# Patient Record
Sex: FEMALE | NOT HISPANIC OR LATINO | Employment: PART TIME | URBAN - METROPOLITAN AREA
[De-identification: names, ages, dates, MRNs, and addresses within clinical notes are randomized per-mention and may not be internally consistent; named-entity substitution may affect disease eponyms.]

---

## 2017-02-07 ENCOUNTER — CARE COORDINATION (OUTPATIENT)
Dept: CASE MANAGEMENT | Facility: CLINIC | Age: 27
End: 2017-02-07

## 2017-02-21 ENCOUNTER — OFFICE VISIT (OUTPATIENT)
Dept: DERMATOLOGY | Facility: CLINIC | Age: 27
End: 2017-02-21

## 2017-02-21 DIAGNOSIS — R61 GENERALIZED HYPERHIDROSIS: Primary | ICD-10-CM

## 2017-02-21 RX ORDER — OXYBUTYNIN CHLORIDE 10 MG/1
10 TABLET, EXTENDED RELEASE ORAL DAILY
Qty: 30 TABLET | Refills: 2 | Status: SHIPPED | OUTPATIENT
Start: 2017-02-21

## 2017-02-21 ASSESSMENT — PAIN SCALES - GENERAL: PAINLEVEL: NO PAIN (0)

## 2017-02-21 NOTE — LETTER
"2/21/2017       RE: Jai Parker  [address]  [facility] [address]     Dear Colleague,    Thank you for referring your patient, Jai Parker, to the [facility] DERMATOLOGY at [facility]. Please see a copy of my visit note below.    [facility] Dermatology Note      Dermatology Problem List:  1. Hyperhidrosis, face, forehead, neck, groin. Work-up for secondary hyperhidrosis pending.   - Labs: CBC w/ diff, CMP, TSH w/ reflex free T4, HgBA1c, urine metanephrines/catecholamines   - oxybutynin XR 10 mg PO qdaily  - prior tx: glycopyrrolate 2 mg PO BID uptitrated up to TID dosing (dry mouth)    Encounter Date: Feb 21, 2017    CC:   Chief Complaint   Patient presents with     Derm Problem     has concerns with her hyperhidrosis and discoloration of skin on inner thighs         History of Present Illness:  Ms. Jai Parker is a 26 year old female who presents in self referral for hyperhidrosis of her face, forehead, neck, back, and groin areas. She states this has been essentially present most of her lifetime but more bothersome for the last 4-5 years. Triggers include vigorous exercise, anxiety with symptoms occuring intermittently throughout the day, nearly everyday. Furthermore, she had recently gained about 60 lbs during her pregnancy last year and feels as though symptoms have been worse since her weight gain. She also complains of \"cold sweats\" at nighttime with occasionally associated fast heart beat. Has had her mother take her blood pressure at home which she states was \"normal\". She reports no excessive sweating in the armpits, palms or other parts of her body. She has no other chronic medical conditions and does not take any medications regularly, including over-the-counter medications. She occasionally does take ibuprofen as needs for headaches. She saw a dermatologist at [facility] in [facility] in 9/2016 who " suggested trying oral glycopyrrolate. She initially started at a dose of 2 mg twice daily which was subsequently increased to 2 mg three times daily. She reports no significant benefit in her symptoms after treatment and significant side effects including dry mouth. This was subsequently discontinued and she was referred to [facility] for further treatment options. She has no other skin concerns.     Past Medical History:   There is no problem list on file for this patient.    History reviewed. No pertinent past medical history.  History reviewed. No pertinent past surgical history.    Social History:  The patient works. The patient denies use of tanning beds.    Family History:  There is no family history of skin cancer.    Medications:  No current outpatient prescriptions on file.        Allergies   Allergen Reactions     Penicillins Hives         Review of Systems:  -Skin/Heme New Pt: The patient denies frequent sun exposure. The patient denies excessive scarring or problems healing except as per HPI. The patient denies excessive bleeding.  -Constitutional: The patient denies fatigue, fevers, chills, unintended weight loss, and night sweats.  -HEENT: Patient denies nonhealing oral sores.  -Skin: As above in HPI. No additional skin concerns.    Physical exam:  Vitals: There were no vitals taken for this visit.  GEN: This is a well developed, well-nourished female in no acute distress, in a pleasant mood.    SKIN: Examination of the face, scalp, neck, upper chest, back, upper extremities, lower extremities, groin/axillae was performed.  - Mild hyperpigmentation in bilateral groin folds.  - Otherwise, exam of areas as above within normal limits.   - No observable sweating today  - No other lesions of concern on areas examined.     Impression/Plan:    1. Hyperhidrosis. Face, scalp, neck, back, and groin area. No palmar or axillary involvement. No skin breakdown, which may limit therapeutic options. Given somewhat  "atypical distribution (not particularly eccrine-rich sites) and self-report history of tachycardia, \"cold sweats\" at nighttime, we recommended work-up for causes of secondary hyperhidrosis including thyroid function, diabetes screening, and urine metabolites to r/o pheocytochroma. She has previously not responded to oral glycopyrrolate at up to 2mg TID. We will alternatively start oxybutynin XR 10 mg PO qdaily which can be further up-titrated as needed. Will also consider propantheline if no response here.     Labs: CBC w/ diff, CMP, TSH, HgBA1c, ESR, urine catecholamines/metanephrines; no flushing/diarrhea to suggest carcinoid, deferred 5HIAA    Start oxybutynin XR 10 mg PO qdaily; can uptitrate at next visit as needed depending on side effects. GoodRx coupon provided as this is unlikely to be covered by her insurance.     Discussed that efficacy rate for many anticholinergics in hyperhidrosis is about 50%; will try this alternative.    Discussed using Zeasorb in the groin folds to absorb moisture    Follow-up in 1 months.     Follow-up in 1 months, earlier for new or changing lesions.   Dr. Nelson staffed the patient.    Staff Involved:  Resident(Maximiliano Hawk)/Staff(as above)    Staff Physician Comments:   I saw and evaluated the patient with the resident and I agree with the assessment and plan.  I was present for the examination.    Michael Nelson MD  Dermatology Staff Physician  , Department of Dermatology          "

## 2017-02-21 NOTE — NURSING NOTE
Dermatology Rooming Note    Jai Parker's goals for this visit include:   Chief Complaint   Patient presents with     Derm Problem     has concerns with her hyperhidrosis and discoloration of skin on inner thighs       Idalia Pichardo LPN

## 2017-02-21 NOTE — PROGRESS NOTES
"[facility] Dermatology Note      Dermatology Problem List:  1. Hyperhidrosis, face, forehead, neck, groin. Work-up for secondary hyperhidrosis pending.   - Labs: CBC w/ diff, CMP, TSH w/ reflex free T4, HgBA1c, urine metanephrines/catecholamines   - oxybutynin XR 10 mg PO qdaily  - prior tx: glycopyrrolate 2 mg PO BID uptitrated up to TID dosing (dry mouth)    Encounter Date: Feb 21, 2017    CC:   Chief Complaint   Patient presents with     Derm Problem     has concerns with her hyperhidrosis and discoloration of skin on inner thighs         History of Present Illness:  Ms. Jai Parker is a 26 year old female who presents in self referral for hyperhidrosis of her face, forehead, neck, back, and groin areas. She states this has been essentially present most of her lifetime but more bothersome for the last 4-5 years. Triggers include vigorous exercise, anxiety with symptoms occuring intermittently throughout the day, nearly everyday. Furthermore, she had recently gained about 60 lbs during her pregnancy last year and feels as though symptoms have been worse since her weight gain. She also complains of \"cold sweats\" at nighttime with occasionally associated fast heart beat. Has had her mother take her blood pressure at home which she states was \"normal\". She reports no excessive sweating in the armpits, palms or other parts of her body. She has no other chronic medical conditions and does not take any medications regularly, including over-the-counter medications. She occasionally does take ibuprofen as needs for headaches. She saw a dermatologist at [facility] in [facility] in 9/2016 who suggested trying oral glycopyrrolate. She initially started at a dose of 2 mg twice daily which was subsequently increased to 2 mg three times daily. She reports no significant benefit in her symptoms after treatment and significant side effects including dry mouth. This was subsequently " "discontinued and she was referred to [facility] for further treatment options. She has no other skin concerns.     Past Medical History:   There is no problem list on file for this patient.    History reviewed. No pertinent past medical history.  History reviewed. No pertinent past surgical history.    Social History:  The patient works. The patient denies use of tanning beds.    Family History:  There is no family history of skin cancer.    Medications:  No current outpatient prescriptions on file.        Allergies   Allergen Reactions     Penicillins Hives         Review of Systems:  -Skin/Heme New Pt: The patient denies frequent sun exposure. The patient denies excessive scarring or problems healing except as per HPI. The patient denies excessive bleeding.  -Constitutional: The patient denies fatigue, fevers, chills, unintended weight loss, and night sweats.  -HEENT: Patient denies nonhealing oral sores.  -Skin: As above in HPI. No additional skin concerns.    Physical exam:  Vitals: There were no vitals taken for this visit.  GEN: This is a well developed, well-nourished female in no acute distress, in a pleasant mood.    SKIN: Examination of the face, scalp, neck, upper chest, back, upper extremities, lower extremities, groin/axillae was performed.  - Mild hyperpigmentation in bilateral groin folds.  - Otherwise, exam of areas as above within normal limits.   - No observable sweating today  - No other lesions of concern on areas examined.     Impression/Plan:    1. Hyperhidrosis. Face, scalp, neck, back, and groin area. No palmar or axillary involvement. No skin breakdown, which may limit therapeutic options. Given somewhat atypical distribution (not particularly eccrine-rich sites) and self-report history of tachycardia, \"cold sweats\" at nighttime, we recommended work-up for causes of secondary hyperhidrosis including thyroid function, diabetes screening, and urine metabolites to r/o pheocytochroma. She has " previously not responded to oral glycopyrrolate at up to 2mg TID. We will alternatively start oxybutynin XR 10 mg PO qdaily which can be further up-titrated as needed. Will also consider propantheline if no response here.     Labs: CBC w/ diff, CMP, TSH, HgBA1c, ESR, urine catecholamines/metanephrines; no flushing/diarrhea to suggest carcinoid, deferred 5HIAA    Start oxybutynin XR 10 mg PO qdaily; can uptitrate at next visit as needed depending on side effects. GoodRx coupon provided as this is unlikely to be covered by her insurance.     Discussed that efficacy rate for many anticholinergics in hyperhidrosis is about 50%; will try this alternative.    Discussed using Zeasorb in the groin folds to absorb moisture    Follow-up in 1 months.     Follow-up in 1 months, earlier for new or changing lesions.   Dr. Nelson staffed the patient.    Staff Involved:  Resident(Maximiliano Hawk)/Staff(as above)    Staff Physician Comments:   I saw and evaluated the patient with the resident and I agree with the assessment and plan.  I was present for the examination.    Michael Nelson MD  Dermatology Staff Physician  , Department of Dermatology

## 2017-02-21 NOTE — MR AVS SNAPSHOT
After Visit Summary   2/21/2017    Jai Parker    MRN: [id number]           Patient Information     Date Of Birth          1990        Visit Information        Provider Department      2/21/2017 3:45 PM Michael Nelson MD [facility] Dermatology        Today's Diagnoses     Generalized hyperhidrosis    -  1       Follow-ups after your visit        Your next 10 appointments already scheduled     Feb 22, 2017 12:30 PM CST   LAB with  LAB   [facility] ([facility])    [address] [place] [id number] [phone]           Patient must bring picture ID.  Patient should be prepared to give a urine specimen  Please do not eat 10-12 hours before your appointment if you are coming in fasting for labs on lipids, cholesterol, or glucose (sugar).  Pregnant women should follow their Care Team instructions. Water with medications is okay. Do not drink coffee or other fluids.   If you have concerns about taking  your medications, please ask at office or if scheduling via SocialDeckt, send a message by clicking on Secure Messaging, Message Your Care Team.            Apr 04, 2017  4:00 PM CDT   (Arrive by 3:45 PM)   Return Visit with Michael Nelson MD   [facility] Dermatology ([facility])    [address] [place] [id number] [phone]              Future tests that were ordered for you today     Open Future Orders        Priority Expected Expires Ordered    Erythrocyte sedimentation rate auto Routine  2/21/2018 2/21/2017    Metanephrine random or 24 hr urine Routine  2/22/2018 2/21/2017    Catecholamines fractioned free urine Routine  2/22/2018 2/21/2017    CBC with platelets differential Routine  2/21/2018 2/21/2017    Comprehensive metabolic panel Routine  2/21/2018 2/21/2017    TSH with free T4 reflex Routine  2/21/2018 2/21/2017    Hemoglobin A1c Routine  2/22/2018 2/21/2017            Who to  contact     Please call your clinic at [phone] to:    Ask questions about your health    Make or cancel appointments    Discuss your medicines    Learn about your test results    Speak to your doctor   If you have compliments or concerns about an experience at your clinic, or if you wish to file a complaint, please contact [facility] Patient Relations at [phone] or email us at [email or web address]         Additional Information About Your Visit        Care EveryWhere ID     This is your Care EveryWhere ID. This could be used by other organizations to access your [facility] medical records  [id number]         Blood Pressure from Last 3 Encounters:   No data found for BP    Weight from Last 3 Encounters:   No data found for Wt                 Today's Medication Changes          These changes are accurate as of: 2/21/17  4:30 PM.  If you have any questions, ask your nurse or doctor.               Start taking these medicines.        Dose/Directions    oxybutynin 10 MG 24 hr tablet   Commonly known as:  DITROPAN XL   Used for:  Generalized hyperhidrosis   Started by:  Michael Nelson MD        Dose:  10 mg   Take 1 tablet (10 mg) by mouth daily   Quantity:  30 tablet   Refills:  2            Where to get your medicines      These medications were sent to [facility] [address] [address] AT [address] [place], [place] [address]     Phone:  [phone]     oxybutynin 10 MG 24 hr tablet                Primary Care Provider    No Pcp Confirmed       No address on file        Thank you!     Thank you for choosing [facility] DERMATOLOGY  for your care. Our goal is always to provide you with excellent care. Hearing back from our patients is one way we can continue to improve our services. Please take a few minutes to complete the written survey that you may receive in the mail after your visit with us. Thank you!              Your Updated Medication List - Protect others around you: Learn how to safely use, store and throw away your medicines at www.disposemymeds.org.          This list is accurate as of: 2/21/17  4:30 PM.  Always use your most recent med list.                   Brand Name Dispense Instructions for use    oxybutynin 10 MG 24 hr tablet    DITROPAN XL    30 tablet    Take 1 tablet (10 mg) by mouth daily

## 2017-07-10 ENCOUNTER — OFFICE VISIT (OUTPATIENT)
Dept: DERMATOLOGY | Facility: CLINIC | Age: 27
End: 2017-07-10

## 2017-07-10 DIAGNOSIS — L74.519 FOCAL HYPERHIDROSIS: ICD-10-CM

## 2017-07-10 DIAGNOSIS — L30.4 INTERTRIGO: ICD-10-CM

## 2017-07-10 DIAGNOSIS — L74.519 FOCAL HYPERHIDROSIS: Primary | ICD-10-CM

## 2017-07-10 LAB
ALBUMIN SERPL-MCNC: 3.6 G/DL (ref 3.4–5)
ALP SERPL-CCNC: 70 U/L (ref 40–150)
ALT SERPL W P-5'-P-CCNC: 13 U/L (ref 0–50)
ANION GAP SERPL CALCULATED.3IONS-SCNC: 7 MMOL/L (ref 3–14)
AST SERPL W P-5'-P-CCNC: 21 U/L (ref 0–45)
BASOPHILS # BLD AUTO: 0.1 10E9/L (ref 0–0.2)
BASOPHILS NFR BLD AUTO: 0.6 %
BILIRUB SERPL-MCNC: 1 MG/DL (ref 0.2–1.3)
BUN SERPL-MCNC: 11 MG/DL (ref 7–30)
CALCIUM SERPL-MCNC: 8.6 MG/DL (ref 8.5–10.1)
CHLORIDE SERPL-SCNC: 106 MMOL/L (ref 94–109)
CO2 SERPL-SCNC: 24 MMOL/L (ref 20–32)
CREAT SERPL-MCNC: 0.65 MG/DL (ref 0.52–1.04)
DIFFERENTIAL METHOD BLD: ABNORMAL
EOSINOPHIL # BLD AUTO: 0.3 10E9/L (ref 0–0.7)
EOSINOPHIL NFR BLD AUTO: 2.4 %
ERYTHROCYTE [DISTWIDTH] IN BLOOD BY AUTOMATED COUNT: 15.5 % (ref 10–15)
ERYTHROCYTE [SEDIMENTATION RATE] IN BLOOD BY WESTERGREN METHOD: 33 MM/H (ref 0–20)
GFR SERPL CREATININE-BSD FRML MDRD: NORMAL ML/MIN/1.7M2
GLUCOSE SERPL-MCNC: 78 MG/DL (ref 70–99)
HBA1C MFR BLD: 5.8 % (ref 4.3–6)
HCT VFR BLD AUTO: 33.3 % (ref 35–47)
HGB BLD-MCNC: 10.9 G/DL (ref 11.7–15.7)
IMM GRANULOCYTES # BLD: 0.1 10E9/L (ref 0–0.4)
IMM GRANULOCYTES NFR BLD: 0.5 %
LYMPHOCYTES # BLD AUTO: 2.2 10E9/L (ref 0.8–5.3)
LYMPHOCYTES NFR BLD AUTO: 20 %
MCH RBC QN AUTO: 20.7 PG (ref 26.5–33)
MCHC RBC AUTO-ENTMCNC: 32.7 G/DL (ref 31.5–36.5)
MCV RBC AUTO: 63 FL (ref 78–100)
MONOCYTES # BLD AUTO: 0.8 10E9/L (ref 0–1.3)
MONOCYTES NFR BLD AUTO: 6.9 %
NEUTROPHILS # BLD AUTO: 7.7 10E9/L (ref 1.6–8.3)
NEUTROPHILS NFR BLD AUTO: 69.6 %
NRBC # BLD AUTO: 0 10*3/UL
NRBC BLD AUTO-RTO: 0 /100
PLATELET # BLD AUTO: 288 10E9/L (ref 150–450)
POTASSIUM SERPL-SCNC: 4.1 MMOL/L (ref 3.4–5.3)
PROT SERPL-MCNC: 7.4 G/DL (ref 6.8–8.8)
RBC # BLD AUTO: 5.26 10E12/L (ref 3.8–5.2)
SODIUM SERPL-SCNC: 136 MMOL/L (ref 133–144)
TSH SERPL DL<=0.005 MIU/L-ACNC: 0.67 MU/L (ref 0.4–4)
WBC # BLD AUTO: 11 10E9/L (ref 4–11)

## 2017-07-10 RX ORDER — KETOCONAZOLE 20 MG/G
CREAM TOPICAL DAILY
Qty: 60 G | Refills: 3 | Status: SHIPPED | OUTPATIENT
Start: 2017-07-10 | End: 2018-06-03

## 2017-07-10 ASSESSMENT — PAIN SCALES - GENERAL: PAINLEVEL: NO PAIN (0)

## 2017-07-10 NOTE — NURSING NOTE
"Dermatology Rooming Note    Jai Parker's goals for this visit include:   Chief Complaint   Patient presents with     Derm Problem     Loki is here today for a hyperhidrosis follow up for her face.  States it's been \"the same\" since her last visit.           Idalia Pichardo, GEORGETTE       "

## 2017-07-10 NOTE — MR AVS SNAPSHOT
After Visit Summary   7/10/2017    Jai Parker    MRN: [id number]           Patient Information     Date Of Birth          1990        Visit Information        Provider Department      7/10/2017 10:00 AM Lashawn Ramirez MD [facility] Dermatology        Today's Diagnoses     Focal hyperhidrosis    -  1    Intertrigo           Follow-ups after your visit        Who to contact     Please call your clinic at [phone] to:    Ask questions about your health    Make or cancel appointments    Discuss your medicines    Learn about your test results    Speak to your doctor   If you have compliments or concerns about an experience at your clinic, or if you wish to file a complaint, please contact [facility] Physicians Patient Relations at [phone] or email us at [email or web address]         Additional Information About Your Visit        Care EveryWhere ID     This is your Care EveryWhere ID. This could be used by other organizations to access your [facility] medical records  [id number]         Blood Pressure from Last 3 Encounters:   No data found for BP    Weight from Last 3 Encounters:   No data found for Wt              We Performed the Following     C Botulinum Toxin type A (Botox)          Today's Medication Changes          These changes are accurate as of: 7/10/17 11:59 PM.  If you have any questions, ask your nurse or doctor.               Start taking these medicines.        Dose/Directions    botulinum toxin type A 100 UNITS injection   Commonly known as:  BOTOX   Used for:  Focal hyperhidrosis   Started by:  Lashawn Ramirez MD        Dose:  100 Units   Inject 100 Units into the muscle once for 1 dose   Quantity:  100 Units   Refills:  0       ketoconazole 2 % cream   Commonly known as:  NIZORAL   Used for:  Intertrigo   Started by:  Lashawn Ramirez MD        Apply topically daily   Quantity:  60 g   Refills:  3            Where to get  your medicines      These medications were sent to [id number] - [place] - [address] AT   [address] [place], [place] [address]     Phone:  [phone]     ketoconazole 2 % cream         Some of these will need a paper prescription and others can be bought over the counter.  Ask your nurse if you have questions.     Bring a paper prescription for each of these medications     botulinum toxin type A 100 UNITS injection                Primary Care Provider    No Pcp Confirmed       No address on file        Equal Access to Services     MARCELA DUMAS : Hadii aad ku hadasho Soomaali, waaxda luqadaha, qaybta kaalmada adeskylaryanorma, adri cuadra . So [facility] [phone].    ATENCIÓN: Si habla español, tiene a tijerina disposición servicios gratuitos de asistencia lingüística. LlEast [facility] [phone].    We comply with applicable federal civil rights laws and [place] laws. We do not discriminate on the basis of race, color, national origin, age, disability sex, sexual orientation or gender identity.            Thank you!     Thank you for choosing [facility] DERMATOLOGY  for your care. Our goal is always to provide you with excellent care. Hearing back from our patients is one way we can continue to improve our services. Please take a few minutes to complete the written survey that you may receive in the mail after your visit with us. Thank you!             Your Updated Medication List - Protect others around you: Learn how to safely use, store and throw away your medicines at www.disposemymeds.org.          This list is accurate as of: 7/10/17 11:59 PM.  Always use your most recent med list.                   Brand Name Dispense Instructions for use Diagnosis    botulinum toxin type A 100 UNITS injection    BOTOX    100 Units    Inject 100 Units into the muscle once for 1 dose    Focal hyperhidrosis       ketoconazole 2 % cream    NIZORAL    60 g     Apply topically daily    Intertrigo       oxybutynin 10 MG 24 hr tablet    DITROPAN XL    30 tablet    Take 1 tablet (10 mg) by mouth daily    Generalized hyperhidrosis

## 2017-07-10 NOTE — PROGRESS NOTES
.  [facility] Dermatology Note      Dermatology Problem List:  1. Hyperhidrosis, face, forehead, neck, groin. Work-up for secondary hyperhidrosis pending.   - Labs: CBC w/ diff, CMP, TSH w/ reflex free T4, HgBA1c, urine metanephrines/catecholamines   - oxybutynin XR 10 mg PO qdaily  - prior tx: glycopyrrolate 2 mg PO BID uptitrated up to TID dosing (dry mouth), oxybutynin (dry mouth)    Encounter Date: Jul 10, 2017    CC:  Facial and scalp sweating that is causing embarrassment, not improving on current medications.    History of Present Illness:  Ms. Jai Parker is a 26 year old female who presents as a follow-up for hyperhidrosis of the face, forehead, neck, back and groin area. The patient was last seen 02/2017 by Dr. Nelson, she was originally referred from the [facility]. Patient was prescribed glycopyrolate in June 2016 but experienced dry mouth so she stopped taking it after two months, she relates that it did not improve her hyperhidrosis. She was started on oxybutynin at her last appointment with Dr. Nelson, she states it has not helped her hyperhidrosis and has also caused dry mouth. She states today she is interested in botox injections since she feels the medications have not helped her at all.  She states she has a 4 year history of hyperhidrosis that worsened during her pregnancy in 2015 and has continued to bother her post-partum. She state it mostly happens when she is active outside but also occurs sometimes at night when she wakes up with tachycardia. She denies any associated rash or pruritis with her hyperhidrosis but relates that it causes her a lot of discomfort and embarrassment. In addition, she relates to darkening of her skin in the inguinal area that she finds embarrassing, she is interested in a lightening cream. Of note, she reports multiple vaginal yeast infections. She relates she was recently seen by an outside physician and had bloodwork done for her  thyroid, per patient bloodwork was normal.     Past Medical History:   There is no problem list on file for this patient.    No past medical history on file.  No past surgical history on file.    Social History:  The patient is currently working, planning to move to [place] next year    Family History:  She denies any family history of hyperhidrosis.     Medications:  Current Outpatient Prescriptions   Medication Sig Dispense Refill     oxybutynin (DITROPAN XL) 10 MG 24 hr tablet Take 1 tablet (10 mg) by mouth daily 30 tablet 2     Allergies   Allergen Reactions     Penicillins Hives         Review of Systems:  -As per HPI  -Constitutional: The patient denies fatigue, fevers, chills, unintended weight loss, and night sweats.  -Skin: As above in HPI. No additional skin concerns.    Physical exam:  Vitals: There were no vitals taken for this visit.  GEN: This is a well developed, well-nourished female in no acute distress, in a pleasant mood.    SKIN: Focused examination of the groin was performed.  - b/l inguinal crease: dark, hyperpigmented thin plaque   -No other lesions of concern on areas examined.     Impression/Plan:  1. Hyperhidrosis of face, scalp, back and groin area, not improved with glycopyrrolate or oxybutynin and patient experiencing anticholinergic side effects from both of these medications. Plan to address constituitional symptoms and distribution of hyperhidrosis with labs to rule out thyroid dysfunction, diabetes, or any signs of malignancy or autoimmune disorders. After labs are completed to rule out any causes for secondary hyperhidrosis, plan for botox scalp treatments.    Labs: CBC w/ diff, CMP, TSH, HgBA1c, ESR, urine catecholamines/metanephrines; no flushing/diarrhea to suggest carcinoid, deferred 5HIAALabs     Plan to stop oxybutynin today     Referral to cosmetics for botox scalp injections    2. Hyperpigmented plaque most consistent with candida intertrigo    Ketoconazole cream 2% BID for 1  week until resolution         Follow-up PRN, patient will follow up with cosmetics for botox    Staff Involved:  Scribed by La Nena Perez, MS4 for Dr. Lashawn Mcintosh.      .The medical student acted as scribe for this encounter.  The encounter documented above was completely performed by myself.  Lashawn Ramirez MD

## 2017-07-10 NOTE — LETTER
7/10/2017       RE: Jai Parker  [address]  [facility] [address]     Dear Colleague,    Thank you for referring your patient, Jai Parker, to the [facility] DERMATOLOGY at [facility]. Please see a copy of my visit note below.    .  [facility] Dermatology Note      Dermatology Problem List:  1. Hyperhidrosis, face, forehead, neck, groin. Work-up for secondary hyperhidrosis pending.   - Labs: CBC w/ diff, CMP, TSH w/ reflex free T4, HgBA1c, urine metanephrines/catecholamines   - oxybutynin XR 10 mg PO qdaily  - prior tx: glycopyrrolate 2 mg PO BID uptitrated up to TID dosing (dry mouth), oxybutynin (dry mouth)    Encounter Date: Jul 10, 2017    CC:  Facial and scalp sweating that is causing embarrassment, not improving on current medications.    History of Present Illness:  Ms. Jai Parker is a 26 year old female who presents as a follow-up for hyperhidrosis of the face, forehead, neck, back and groin area. The patient was last seen 02/2017 by Dr. Nelson, she was originally referred from the [facility] clinic. Patient was prescribed glycopyrolate in June 2016 but experienced dry mouth so she stopped taking it after two months, she relates that it did not improve her hyperhidrosis. She was started on oxybutynin at her last appointment with Dr. Nelson, she states it has not helped her hyperhidrosis and has also caused dry mouth. She states today she is interested in botox injections since she feels the medications have not helped her at all.  She states she has a 4 year history of hyperhidrosis that worsened during her pregnancy in 2015 and has continued to bother her post-partum. She state it mostly happens when she is active outside but also occurs sometimes at night when she wakes up with tachycardia. She denies any associated rash or pruritis with her hyperhidrosis but relates that it causes her a lot of discomfort and embarrassment. In  addition, she relates to darkening of her skin in the inguinal area that she finds embarrassing, she is interested in a lightening cream. Of note, she reports multiple vaginal yeast infections. She relates she was recently seen by an outside physician and had bloodwork done for her thyroid, per patient bloodwork was normal.     Past Medical History:   There is no problem list on file for this patient.    No past medical history on file.  No past surgical history on file.    Social History:  The patient is currently working, planning to move to [place] next year    Family History:  She denies any family history of hyperhidrosis.     Medications:  Current Outpatient Prescriptions   Medication Sig Dispense Refill     oxybutynin (DITROPAN XL) 10 MG 24 hr tablet Take 1 tablet (10 mg) by mouth daily 30 tablet 2     Allergies   Allergen Reactions     Penicillins Hives         Review of Systems:  -As per HPI  -Constitutional: The patient denies fatigue, fevers, chills, unintended weight loss, and night sweats.  -Skin: As above in HPI. No additional skin concerns.    Physical exam:  Vitals: There were no vitals taken for this visit.  GEN: This is a well developed, well-nourished female in no acute distress, in a pleasant mood.    SKIN: Focused examination of the groin was performed.  - b/l inguinal crease: dark, hyperpigmented thin plaque   -No other lesions of concern on areas examined.     Impression/Plan:  1. Hyperhidrosis of face, scalp, back and groin area, not improved with glycopyrrolate or oxybutynin and patient experiencing anticholinergic side effects from both of these medications. Plan to address constituitional symptoms and distribution of hyperhidrosis with labs to rule out thyroid dysfunction, diabetes, or any signs of malignancy or autoimmune disorders. After labs are completed to rule out any causes for secondary hyperhidrosis, plan for botox scalp treatments.    Labs: CBC w/ diff, CMP, TSH, HgBA1c, ESR,  urine catecholamines/metanephrines; no flushing/diarrhea to suggest carcinoid, deferred 5HIAALabs     Plan to stop oxybutynin today     Referral to cosmetics for botox scalp injections    2. Hyperpigmented plaque most consistent with candida intertrigo    Ketoconazole cream 2% BID for 1 week until resolution         Follow-up PRN, patient will follow up with cosmetics for botox    Staff Involved:  Scribed by La Nena Perez, MS4 for Dr. Lashawn Mcintosh.      .The medical student acted as scribe for this encounter.  The encounter documented above was completely performed by myself.  Lashawn Ramirez MD

## 2017-07-13 LAB
COLLECT DURATION TIME UR: NORMAL H
CREAT 24H UR-MRATE: NORMAL G/(24.H)
CREAT SERPL-MCNC: 224 MG/DL
METANEPH 24H UR-MCNC: 315 NG/ML
METANEPH 24H UR-MRATE: NORMAL
METANEPH+NORMETANEPH UR-IMP: NORMAL
METANEPH/CREAT 24H UR: 141
NORMETANEPHRINE 24H UR-MCNC: 266 UG/ML
NORMETANEPHRINE 24H UR-MRATE: NORMAL
NORMETANEPHRINE/CREAT 24H UR: 119
SPECIMEN VOL ?TM UR: NORMAL ML

## 2017-07-17 ENCOUNTER — TELEPHONE (OUTPATIENT)
Dept: DERMATOLOGY | Facility: CLINIC | Age: 27
End: 2017-07-17

## 2017-07-17 NOTE — TELEPHONE ENCOUNTER
Sent PA information to Katie. PA was approved, I routed message to Lolita to get patient scheduled with Dr. Miller.

## 2017-08-16 ENCOUNTER — TELEPHONE (OUTPATIENT)
Dept: DERMATOLOGY | Facility: CLINIC | Age: 27
End: 2017-08-16

## 2017-08-16 NOTE — TELEPHONE ENCOUNTER
LVM for patient to call clinic if she has questions regarding scalp botox.    Sarah Churchill RN

## 2017-08-16 NOTE — TELEPHONE ENCOUNTER
----- Message from Andressa Merchant sent at 8/15/2017  5:19 PM CDT -----  Regarding: scalp botox  Seeing Dr. Becerra on Thur, worried about pain management for botox injections.     Thanks!

## 2017-08-17 DIAGNOSIS — L74.519 FOCAL HYPERHIDROSIS: Primary | ICD-10-CM

## 2017-08-17 NOTE — PROGRESS NOTES
Orders for botox fr focal hyperhidrosis of scalp.    Freddie Becerra MD, MS    Department of Dermatology  [facility]: Phone: [phone], Fax:[phone]  [facility]: Phone: [phone], Fax: [phone]

## 2018-01-03 ENCOUNTER — OFFICE VISIT (OUTPATIENT)
Dept: DERMATOLOGY | Facility: CLINIC | Age: 28
End: 2018-01-03
Payer: COMMERCIAL

## 2018-01-03 VITALS
SYSTOLIC BLOOD PRESSURE: 136 MMHG | RESPIRATION RATE: 18 BRPM | DIASTOLIC BLOOD PRESSURE: 88 MMHG | WEIGHT: 177.4 LBS | HEART RATE: 74 BPM

## 2018-01-03 DIAGNOSIS — L74.519 FOCAL HYPERHIDROSIS: Primary | ICD-10-CM

## 2018-01-03 ASSESSMENT — PAIN SCALES - GENERAL: PAINLEVEL: NO PAIN (0)

## 2018-01-03 NOTE — MR AVS SNAPSHOT
After Visit Summary   1/3/2018    Jai Parker    MRN: [id number]           Patient Information     Date Of Birth          1990        Visit Information        Provider Department      1/3/2018 12:00 PM Emma Miller MD [facility] Dermatology         Follow-ups after your visit        Your next 10 appointments already scheduled     2018 12:00 PM CST   (Arrive by 11:45 AM)   Return Visit with MD ANTHONY Woodall [facility] Dermatology ([facility])    [address] [place] [id number] [phone]              Who to contact     Please call your clinic at [phone] to:    Ask questions about your health    Make or cancel appointments    Discuss your medicines    Learn about your test results    Speak to your doctor   If you have compliments or concerns about an experience at your clinic, or if you wish to file a complaint, please contact [facility] Patient Relations at [phone] or email us at [email or web address]         Additional Information About Your Visit        MyChart Information     MolecularMD is an electronic gateway that provides easy, online access to your medical records. With MolecularMD, you can request a clinic appointment, read your test results, renew a prescription or communicate with your care team.     To sign up for MolecularMD visit the website at [email or web address]   You will be asked to enter the access code listed below, as well as some personal information. Please follow the directions to create your username and password.     Your access code is: [email or web address]  Expires: 2018  5:31 AM     Your access code will  in 90 days. If you need help or a new code, please contact your [facility] or call [phone] for assistance.        Care EveryWhere ID     This is your Care EveryWhere ID. This could be used by other organizations to access  your [place] medical records  [id number]        Your Vitals Were     Pulse Respirations                74 18           Blood Pressure from Last 3 Encounters:   01/03/18 136/88    Weight from Last 3 Encounters:   01/03/18 80.5 kg (177 lb 6.4 oz)              Today, you had the following     No orders found for display       Primary Care Provider    None Specified       No primary provider on file.        Equal Access to Services     [facility]: Hadii aad ku hadasho Soomaali, waaxda luqadaha, qaybta kaalmada adeegyada, waxay orain denizn adeskylar dumas khalif . So [facility] [phone].    ATENCIÓN: Si habla español, tiene a tijerina disposición servicios gratuitos de asistencia lingüística. Llame al [phone].    We comply with applicable federal civil rights laws and [place] laws. We do not discriminate on the basis of race, color, national origin, age, disability, sex, sexual orientation, or gender identity.            Thank you!     Thank you for choosing [facility] DERMATOLOGY  for your care. Our goal is always to provide you with excellent care. Hearing back from our patients is one way we can continue to improve our services. Please take a few minutes to complete the written survey that you may receive in the mail after your visit with us. Thank you!             Your Updated Medication List - Protect others around you: Learn how to safely use, store and throw away your medicines at www.disposemymeds.org.          This list is accurate as of: 1/3/18  2:31 PM.  Always use your most recent med list.                   Brand Name Dispense Instructions for use Diagnosis    botulinum toxin type A 100 UNITS injection    BOTOX    200 Units    Inject 200 units intradermally into affected areas of the scalp.    Focal hyperhidrosis       ketoconazole 2 % cream    NIZORAL    60 g    Apply topically daily    Intertrigo       oxybutynin 10 MG 24 hr tablet    DITROPAN XL    30 tablet    Take 1 tablet (10 mg) by mouth daily     Generalized hyperhidrosis

## 2018-01-03 NOTE — LETTER
1/3/2018       RE: Jai Parker  [address]  [facility] [address]     Dear Colleague,    Thank you for referring your patient, Jai Parker, to the [facility] DERMATOLOGY at [facility]. Please see a copy of my visit note below.    .  [facility] Dermatology Note      Dermatology Problem List:  1. Hyperhidrosis, face, forehead, neck, groin. Work-up for secondary hyperhidrosis pending.   - Labs: CBC w/ diff, CMP, TSH w/ reflex free T4, HgBA1c, urine metanephrines/catecholamines   - oxybutynin XR 10 mg PO qdaily  - prior tx: glycopyrrolate 2 mg PO BID uptitrated up to TID dosing (dry mouth), oxybutynin (dry mouth)    Encounter Date: Tae 3, 2018    CC:  Facial and scalp sweating that is causing embarrassment, not improving on current medications.    History of Present Illness:  Ms. Jai Parker is a 27 year old female who presents as a follow-up for hyperhidrosis of the face, forehead, neck, back and groin area. The patient was last seen 7/10/17 when she had a work up for Labs. The patient notes that her face and back of the neck will get soaked due to sweating from scalp and forehead. The patient works out and does not want to stop sweating altogether. The patient takes no medications. The patient tried pill treatments and they made her very dry. .        Past Medical History:   Patient Active Problem List   Diagnosis     Focal hyperhidrosis     History reviewed. No pertinent past medical history.  History reviewed. No pertinent surgical history.    Social History:  The patient is currently working, planning to move to [place] next year    Family History:  She denies any family history of hyperhidrosis.     Medications:  Current Outpatient Prescriptions   Medication Sig Dispense Refill     botulinum toxin type A (BOTOX) 100 UNITS injection Inject 200 units intradermally into affected areas of the scalp. 200 Units 0     ketoconazole (NIZORAL) 2 % cream  Apply topically daily 60 g 3     oxybutynin (DITROPAN XL) 10 MG 24 hr tablet Take 1 tablet (10 mg) by mouth daily (Patient not taking: Reported on 1/3/2018) 30 tablet 2     Allergies   Allergen Reactions     Penicillins Hives     Amoxicillin Rash         Review of Systems:  -As per HPI  -Constitutional: The patient denies fatigue, fevers, chills, unintended weight loss, and night sweats.  -Skin: As above in HPI. No additional skin concerns.    Physical exam:  Vitals: /88 (Cuff Size: Adult Regular)  Pulse 74  Resp 18  Wt 80.5 kg (177 lb 6.4 oz)  GEN: This is a well developed, well-nourished female in no acute distress, in a pleasant mood.    SKIN: Focused examination of the face was performed.  -Face is clear on exam  -No other lesions of concern on areas examined.     Component      Latest Ref Rng & Units 7/10/2017   WBC      4.0 - 11.0 10e9/L 11.0   RBC Count      3.8 - 5.2 10e12/L 5.26 (H)   Hemoglobin      11.7 - 15.7 g/dL 10.9 (L)   Hematocrit      35.0 - 47.0 % 33.3 (L)   MCV      78 - 100 fl 63 (L)   MCH      26.5 - 33.0 pg 20.7 (L)   MCHC      31.5 - 36.5 g/dL 32.7   RDW      10.0 - 15.0 % 15.5 (H)   Platelet Count      150 - 450 10e9/L 288   Diff Method       Automated Method   % Neutrophils      % 69.6   % Lymphocytes      % 20.0   % Monocytes      % 6.9   % Eosinophils      % 2.4   % Basophils      % 0.6   % Immature Granulocytes      % 0.5   Nucleated RBCs      0 /100 0   Absolute Neutrophil      1.6 - 8.3 10e9/L 7.7   Absolute Lymphocytes      0.8 - 5.3 10e9/L 2.2   Absolute Monocytes      0.0 - 1.3 10e9/L 0.8   Absolute Eosinophils      0.0 - 0.7 10e9/L 0.3   Absolute Basophils      0.0 - 0.2 10e9/L 0.1   Abs Immature Granulocytes      0 - 0.4 10e9/L 0.1   Absolute Nucleated RBC       0.0   Sodium      133 - 144 mmol/L 136   Potassium      3.4 - 5.3 mmol/L 4.1   Chloride      94 - 109 mmol/L 106   Carbon Dioxide      20 - 32 mmol/L 24   Anion Gap      3 - 14 mmol/L 7   Glucose      70 - 99  mg/dL 78   Urea Nitrogen      7 - 30 mg/dL 11   Creatinine      0.52 - 1.04 mg/dL 0.65   GFR Estimate      >60 mL/min/1.7m2 >90 . . .   GFR Estimate If Black      >60 mL/min/1.7m2 >90 . . .   Calcium      8.5 - 10.1 mg/dL 8.6   Bilirubin Total      0.2 - 1.3 mg/dL 1.0   Albumin      3.4 - 5.0 g/dL 3.6   Protein Total      6.8 - 8.8 g/dL 7.4   Alkaline Phosphatase      40 - 150 U/L 70   ALT      0 - 50 U/L 13   AST      0 - 45 U/L 21   Catecholamines Duration Urine      h Random . . .   Catecholamines Volume Urine      mL Random . . .   Urine Dopamine/Creatinine       205   Urine Dopamine       Not Applicable . . .   Dopamine per Volume Urine       17   Urine Norepineph/Creatinine       18   Urine Norepinephrine       Not Applicable . . .   Norepinephrine per Volume Urine       40   Urine Epinephrine/Creatinine       8   Urine Epinephrine       Not Applicable . . .   Epinephrine per Volume Urine       460   Catecholamines Fract Urine Free Interp       SEE NOTE . . .   Catecholamines Fract Creat Random Urine Free       224   Catecholamines Fract Creat Timed Urine Free       Not Applicable . . .   Metanephrine Duration Urine      h Random . . .   Volume      mL Random . . .   Metanephr 24 H ur/cr       141   Metanephrine 24 Hr/Random Urine       Not Applicable . . .   Metanephrine Urine per Volume       315   Normetaneph 24H ur/cr       119   Normetanephrine 24 Hr/Random Urine       Not Applicable . . .   Normetanephrine Urine per Volume       266   Metanephrine 24 Hr/Random Urine Interpretation       SEE NOTE . . .   Creatinine For Methaneph 24 H/Random Urine       224   Creatinine 24 hour       Not Applicable . . .   TSH      0.40 - 4.00 mU/L 0.67   Sed Rate      0 - 20 mm/h 33 (H)   Hemoglobin A1C      4.3 - 6.0 % 5.8       Impression/Plan:  1. Hyperhidrosis of face, scalp, back and groin area, not improved with glycopyrrolate or oxybutynin and patient experiencing anticholinergic side effects from both of these  medications.       Scheduled botox for the frontotemporal scalp and the back of fuller  for next visit.     2. Hyperpigmented plaque most consistent with candida intertrigo    Continue Ketoconazole cream 2% BID for 1 week until resolution    We will recheck at follow up visit       Follow-up as we will schedule her procedure     Staff Involved:  Scribe/Staff     Scribe Disclosure:   I, Keke Metz, am serving as a scribe to document services personally performed by Dr. Emma Miller, based on data collection and the provider's statements to me.       Provider Disclosure:   The documentation recorded by the scribe accurately reflects the services I personally performed and the decisions made by me.    Emma Miller MD    Department of Dermatology  [facility]: Phone: [phone], Fax:[phone]  [facility]: Phone: [phone], Fax: [phone]

## 2018-01-03 NOTE — PROGRESS NOTES
.  [facility] Dermatology Note      Dermatology Problem List:  1. Hyperhidrosis, face, forehead, neck, groin. Work-up for secondary hyperhidrosis pending.   - Labs: CBC w/ diff, CMP, TSH w/ reflex free T4, HgBA1c, urine metanephrines/catecholamines   - oxybutynin XR 10 mg PO qdaily  - prior tx: glycopyrrolate 2 mg PO BID uptitrated up to TID dosing (dry mouth), oxybutynin (dry mouth)    Encounter Date: Tae 3, 2018    CC:  Facial and scalp sweating that is causing embarrassment, not improving on current medications.    History of Present Illness:  Ms. Jai Parker is a 27 year old female who presents as a follow-up for hyperhidrosis of the face, forehead, neck, back and groin area. The patient was last seen 7/10/17 when she had a work up for Labs. The patient notes that her face and back of the neck will get soaked due to sweating from scalp and forehead. The patient works out and does not want to stop sweating altogether. The patient takes no medications. The patient tried pill treatments and they made her very dry. .        Past Medical History:   Patient Active Problem List   Diagnosis     Focal hyperhidrosis     History reviewed. No pertinent past medical history.  History reviewed. No pertinent surgical history.    Social History:  The patient is currently working, planning to move to [place] next year    Family History:  She denies any family history of hyperhidrosis.     Medications:  Current Outpatient Prescriptions   Medication Sig Dispense Refill     botulinum toxin type A (BOTOX) 100 UNITS injection Inject 200 units intradermally into affected areas of the scalp. 200 Units 0     ketoconazole (NIZORAL) 2 % cream Apply topically daily 60 g 3     oxybutynin (DITROPAN XL) 10 MG 24 hr tablet Take 1 tablet (10 mg) by mouth daily (Patient not taking: Reported on 1/3/2018) 30 tablet 2     Allergies   Allergen Reactions     Penicillins Hives     Amoxicillin Rash         Review of Systems:  -As  per HPI  -Constitutional: The patient denies fatigue, fevers, chills, unintended weight loss, and night sweats.  -Skin: As above in HPI. No additional skin concerns.    Physical exam:  Vitals: /88 (Cuff Size: Adult Regular)  Pulse 74  Resp 18  Wt 80.5 kg (177 lb 6.4 oz)  GEN: This is a well developed, well-nourished female in no acute distress, in a pleasant mood.    SKIN: Focused examination of the face was performed.  -Face is clear on exam  -No other lesions of concern on areas examined.     Component      Latest Ref Rng & Units 7/10/2017   WBC      4.0 - 11.0 10e9/L 11.0   RBC Count      3.8 - 5.2 10e12/L 5.26 (H)   Hemoglobin      11.7 - 15.7 g/dL 10.9 (L)   Hematocrit      35.0 - 47.0 % 33.3 (L)   MCV      78 - 100 fl 63 (L)   MCH      26.5 - 33.0 pg 20.7 (L)   MCHC      31.5 - 36.5 g/dL 32.7   RDW      10.0 - 15.0 % 15.5 (H)   Platelet Count      150 - 450 10e9/L 288   Diff Method       Automated Method   % Neutrophils      % 69.6   % Lymphocytes      % 20.0   % Monocytes      % 6.9   % Eosinophils      % 2.4   % Basophils      % 0.6   % Immature Granulocytes      % 0.5   Nucleated RBCs      0 /100 0   Absolute Neutrophil      1.6 - 8.3 10e9/L 7.7   Absolute Lymphocytes      0.8 - 5.3 10e9/L 2.2   Absolute Monocytes      0.0 - 1.3 10e9/L 0.8   Absolute Eosinophils      0.0 - 0.7 10e9/L 0.3   Absolute Basophils      0.0 - 0.2 10e9/L 0.1   Abs Immature Granulocytes      0 - 0.4 10e9/L 0.1   Absolute Nucleated RBC       0.0   Sodium      133 - 144 mmol/L 136   Potassium      3.4 - 5.3 mmol/L 4.1   Chloride      94 - 109 mmol/L 106   Carbon Dioxide      20 - 32 mmol/L 24   Anion Gap      3 - 14 mmol/L 7   Glucose      70 - 99 mg/dL 78   Urea Nitrogen      7 - 30 mg/dL 11   Creatinine      0.52 - 1.04 mg/dL 0.65   GFR Estimate      >60 mL/min/1.7m2 >90 . . .   GFR Estimate If Black      >60 mL/min/1.7m2 >90 . . .   Calcium      8.5 - 10.1 mg/dL 8.6   Bilirubin Total      0.2 - 1.3 mg/dL 1.0   Albumin       3.4 - 5.0 g/dL 3.6   Protein Total      6.8 - 8.8 g/dL 7.4   Alkaline Phosphatase      40 - 150 U/L 70   ALT      0 - 50 U/L 13   AST      0 - 45 U/L 21   Catecholamines Duration Urine      h Random . . .   Catecholamines Volume Urine      mL Random . . .   Urine Dopamine/Creatinine       205   Urine Dopamine       Not Applicable . . .   Dopamine per Volume Urine       17   Urine Norepineph/Creatinine       18   Urine Norepinephrine       Not Applicable . . .   Norepinephrine per Volume Urine       40   Urine Epinephrine/Creatinine       8   Urine Epinephrine       Not Applicable . . .   Epinephrine per Volume Urine       460   Catecholamines Fract Urine Free Interp       SEE NOTE . . .   Catecholamines Fract Creat Random Urine Free       224   Catecholamines Fract Creat Timed Urine Free       Not Applicable . . .   Metanephrine Duration Urine      h Random . . .   Volume      mL Random . . .   Metanephr 24 H ur/cr       141   Metanephrine 24 Hr/Random Urine       Not Applicable . . .   Metanephrine Urine per Volume       315   Normetaneph 24H ur/cr       119   Normetanephrine 24 Hr/Random Urine       Not Applicable . . .   Normetanephrine Urine per Volume       266   Metanephrine 24 Hr/Random Urine Interpretation       SEE NOTE . . .   Creatinine For Methaneph 24 H/Random Urine       224   Creatinine 24 hour       Not Applicable . . .   TSH      0.40 - 4.00 mU/L 0.67   Sed Rate      0 - 20 mm/h 33 (H)   Hemoglobin A1C      4.3 - 6.0 % 5.8       Impression/Plan:  1. Hyperhidrosis of face, scalp, back and groin area, not improved with glycopyrrolate or oxybutynin and patient experiencing anticholinergic side effects from both of these medications.       Scheduled botox for the frontotemporal scalp and the back of fuller  for next visit.     2. Hyperpigmented plaque most consistent with candida intertrigo    Continue Ketoconazole cream 2% BID for 1 week until resolution    We will recheck at follow up visit        Follow-up as we will schedule her procedure     Staff Involved:  Scribe/Staff     Scribe Disclosure:   I, Keke Lashay, am serving as a scribe to document services personally performed by Dr. Emma Miller, based on data collection and the provider's statements to me.       Provider Disclosure:   The documentation recorded by the scribe accurately reflects the services I personally performed and the decisions made by me.    Emma Miller MD    Department of Dermatology  [facility]: Phone: [phone], Fax:[phone]  [facility]: Phone: [phone], Fax: [phone]

## 2018-01-03 NOTE — NURSING NOTE
Dermatology Rooming Note    Jai Parker's goals for this visit include:   Chief Complaint   Patient presents with     Hair Loss     G[place]colt is here today for Botox for her scalp.      Arlin Lopez MA

## 2018-01-31 ENCOUNTER — DOCUMENTATION ONLY (OUTPATIENT)
Dept: DERMATOLOGY | Facility: CLINIC | Age: 28
End: 2018-01-31

## 2018-01-31 DIAGNOSIS — R61 HYPERHIDROSIS: Primary | ICD-10-CM

## 2018-01-31 NOTE — PROGRESS NOTES
Please place orders for 100 units of botox.  Patient has an appointment on 2/7/18 for scalp botox for hyperhidrosis.    See specialty comments below.        Dept Specialty: Infusion Therapy  APPROVED:    BOTOX () [place] WITH PA # [id number] FOR DX L74.519 (Primary focal hyperhidrosis, unspecified) FROM 7.11.17. - 7.11.18. REFERRAL # [id number]. KMD 1.2.18.    TSERING PARIKH ACTIVE 7/17, 11/17, 1/18    Sun Wagner CMA

## 2018-02-07 ENCOUNTER — OFFICE VISIT (OUTPATIENT)
Dept: DERMATOLOGY | Facility: CLINIC | Age: 28
End: 2018-02-07
Payer: COMMERCIAL

## 2018-02-07 DIAGNOSIS — R61 HYPERHIDROSIS: Primary | ICD-10-CM

## 2018-02-07 NOTE — MR AVS SNAPSHOT
After Visit Summary   2/7/2018    Jai Parker    MRN: [id number]           Patient Information     Date Of Birth          1990        Visit Information        Provider Department      2/7/2018 12:00 PM Emma Miller MD [facility] Dermatology        Today's Diagnoses     Hyperhidrosis    -  1      Care Instructions    Botulinum Toxin(Botox) for Hyperhidrosis:    With treatment side effects may include bruising or discomfort at the site(s). Asymmetry may occur and a touch-up may be required. Risks of this procedure include numbness, twitching, headache, not enough effect or too much effect, loss of muscle tone, or infection.      Showering is not restricted.     Do not rub the treated area.     Avoid exercise for the 24 hours following the procedure.     Avoid deodorant for 24 hours following the procedure    Some people will experience bruising.  This is temporary and usually mild.    Call your doctor if you have any questions or concerns after your treatment.     Who should I call with questions?    [facility]: [phone]     [facility]: [phone]    For urgent needs outside of business hours call the [facility] at [phone] and ask for the dermatology resident on call              Follow-ups after your visit        Follow-up notes from your care team     Return in about 6 months (around 8/7/2018) for botox.      Who to contact     Please call your clinic at [phone] to:    Ask questions about your health    Make or cancel appointments    Discuss your medicines    Learn about your test results    Speak to your doctor   If you have compliments or concerns about an experience at your clinic, or if you wish to file a complaint, please contact [facility] Patient Relations at [phone] or email us at [email or web address]         Additional Information About Your Visit         TDI [employer]line Information     [employer] is an electronic gateway that provides easy, online access to your medical records. With [employer], you can request a clinic appointment, read your test results, renew a prescription or communicate with your care team.     To sign up for [employer] visit the website at [email or web address]   You will be asked to enter the access code listed below, as well as some personal information. Please follow the directions to create your username and password.     Your access code is: [email or web address]  Expires: 2018  6:30 AM     Your access code will  in 90 days. If you need help or a new code, please contact your [facility] or call [phone] for assistance.        Care EveryWhere ID     This is your Care EveryWhere ID. This could be used by other organizations to access your [place] medical records  [id number]         Blood Pressure from Last 3 Encounters:   18 136/88    Weight from Last 3 Encounters:   18 80.5 kg (177 lb 6.4 oz)              Today, you had the following     No orders found for display       Primary Care Provider    None Specified       No primary provider on file.        Equal Access to Services     [facility]: Hadii jeevan Steen, wamirnada veronica, qapenelopeta kaalmada lawrence, adri cuadra . So [facility] [phone].    ATENCIÓN: Si habla español, tiene a tijerina disposición servicios gratuitos de asistencia lingüística. Llame al [phone].    We comply with applicable federal civil rights laws and [place] laws. We do not discriminate on the basis of race, color, national origin, age, disability, sex, sexual orientation, or gender identity.            Thank you!     Thank you for choosing [facility]ERMAT[facility]  for your care. Our goal is always to provide you with excellent care. Hearing back from our patients is one way we can continue to improve our services. Please take a few  minutes to complete the written survey that you may receive in the mail after your visit with us. Thank you!             Your Updated Medication List - Protect others around you: Learn how to safely use, store and throw away your medicines at www.disposemymeds.org.          This list is accurate as of 2/7/18  1:14 PM.  Always use your most recent med list.                   Brand Name Dispense Instructions for use Diagnosis    * botulinum toxin type A 100 UNITS injection    BOTOX    200 Units    Inject 200 units intradermally into affected areas of the scalp.    Focal hyperhidrosis       * botulinum toxin type A 100 UNITS injection    BOTOX    1 each    For clinic use    Hyperhidrosis       ketoconazole 2 % cream    NIZORAL    60 g    Apply topically daily    Intertrigo       oxybutynin 10 MG 24 hr tablet    DITROPAN XL    30 tablet    Take 1 tablet (10 mg) by mouth daily    Generalized hyperhidrosis       * Notice:  This list has 2 medication(s) that are the same as other medications prescribed for you. Read the directions carefully, and ask your doctor or other care provider to review them with you.

## 2018-02-07 NOTE — LETTER
2/7/2018       RE: Jai Parker  [address]  [place] [address]     Dear Colleague,    Thank you for referring your patient, Jai Parker, to the [facility] D[facility] at [facility]. Please see a copy of my visit note below.    .  [facility] Dermatology Note      Dermatology Problem List:  1. Hyperhidrosis, face, forehead, neck, groin. Work-up for secondary hyperhidrosis pending.   - Labs: CBC w/ diff, CMP, TSH w/ reflex free T4, HgBA1c, urine metanephrines/catecholamines   - oxybutynin XR 10 mg PO qdaily  - prior tx: glycopyrrolate 2 mg PO BID uptitrated up to TID dosing (dry mouth), oxybutynin (dry mouth)    Encounter Date: Feb 7, 2018    CC:  Facial and scalp sweating that is causing embarrassment, not improving on current medications.    History of Present Illness:  Ms. Jai Parker is a 27 year old female who presents as a follow-up for hyperhidrosis of the face, forehead, neck, back and groin area. The patient was last seen 7/10/17 when she had a work up for Labs. The patient notes that her face and back of the neck will get soaked due to sweating from scalp and forehead. The patient works out and does not want to stop sweating altogether. The patient takes no medications. The patient tried pill treatments and they made her very dry. .        Past Medical History:   Patient Active Problem List   Diagnosis     Focal hyperhidrosis     No past medical history on file.  No past surgical history on file.    Social History:  The patient is currently working, planning to move to [place] next year    Family History:  She denies any family history of hyperhidrosis.     Medications:  Current Outpatient Prescriptions   Medication Sig Dispense Refill     botulinum toxin type A (BOTOX) 100 UNITS injection For clinic use 1 each 0     botulinum toxin type A (BOTOX) 100 UNITS injection Inject 200 units intradermally into affected areas of  the scalp. 200 Units 0     ketoconazole (NIZORAL) 2 % cream Apply topically daily 60 g 3     oxybutynin (DITROPAN XL) 10 MG 24 hr tablet Take 1 tablet (10 mg) by mouth daily 30 tablet 2     Allergies   Allergen Reactions     Penicillins Hives     Amoxicillin Rash         Review of Systems:  -As per HPI  -Constitutional: The patient is feeling generally well.   -Skin: As above in HPI. No additional skin concerns.    Physical exam:  Vitals: There were no vitals taken for this visit.  GEN: This is a well developed, well-nourished female in no acute distress, in a pleasant mood.    SKIN: Focused examination of the face was performed.  -Face is clear on exam  -No other lesions of concern on areas examined.     Impression/Plan:  1. Hyperhidrosis of face, scalp, back and groin area, not improved with glycopyrrolate or oxybutynin and patient experiencing anticholinergic side effects from both of these medications.       See procedure note: Botox for hyperhidrosis.     2. Hyperpigmented plaque most consistent with candida intertrigo- improved with ketoconazole cream, per pt report        Follow-up in 6 months, earlier for new or changing lesions.     Staff Involved:  Scribe/Staff     Scribe Disclosure:   I, Keke Metz, am serving as a scribe to document services personally performed by Dr. Emma Miller, based on data collection and the provider's statements to me.         Again, thank you for allowing me to participate in the care of your patient.      Sincerely,    Emma Miller MD

## 2018-02-07 NOTE — PROCEDURES
Botulinum Injection Procedure Note:  Medical    ATTENDING STAFF SURGEON: Dr. Emma Miller     RESIDENT SURGEON: buster     NURSE: Sun Wagner CMA      ANESTHESIA:   None    PREOPERATIVE DIAGNOSIS:   Hyperhidrosis    LOCATION: Scalp/Hairline    LOT NO: [id number]    EXP DATE:09/2020    OPERATION/PROCEDURE:   Intralesional botulinum toxin injection     Dilution with 4.0 cc preserved sterile normal saline in a 100 Unit Botox Vial.    Total units of botulinum toxin: 100 units     POSTOPERATIVE DIAGNOSIS:   SAME     PREPARATION:   Alcohol swab    DESCRIPTION OF OPERATION/PROCEDURE:   The nature and purpose of the procedure, associated risks including but not limited to bruising, headache or discomfort at the site(s), numbness, muscle twitching, brow or eyelid droop, headache, double vision, not enough effect or too much effect, difficulty whistling or drinking from a straw, loss of muscle tone, or infection. Possible consequences and complications, and alternative methods of treatment were explained in detail. The patient declined a personal or family history of neuromuscular disease prior to the procedure. The patient is not pregnant or breast feeding.A signed informed operative consent was obtained.    The patient was taken to the operative suite and properly positioned. The area to be treated was defined and confirmed by the patient and physician. The area for Botox injection was marked.    Therapeutic procedure: The patient was positiioned to optimally expose the area to be treated. Mulitple injections were made to infiltrate the toxin superficially.The area was carefully cleansed. The patient was instructed to keep this area dry and to continue antiperspirants as previously. The patient tolerated the procedure well and no comlications were noted. Patient was given post care instructions and will be followed up in 6 months.      Clinical Follow-Up: 6 months    Staff Involved:  Scribe/Staff      Scribe Disclosure:   ADAN  Keke Metz, am serving as a scribe to document services personally performed by Dr. Emma Miller, based on data collection and the provider's statements to me.     Provider Disclosure:   The documentation recorded by the scribe accurately reflects the services I personally performed and the decisions made by me.    Emma Miller MD    Department of Dermatology  [facility]: Phone: [phone], Fax:[phone]  [facility] [place]: Phone: [phone], Fax: [phone]

## 2018-02-07 NOTE — NURSING NOTE
Dermatology Rooming Note    Jai Parker's goals for this visit include:   Chief Complaint   Patient presents with     Procedure     Botox - Scalp - Hyperhidrosis       Is a scribe okay for this visit: YES    Are records needed for this visit(If yes, obtain release of information): NO     Vitals: There were no vitals taken for this visit.    Referring Provider:  Referred Self, MD  No address on file    Sun Wagner CMA

## 2018-02-07 NOTE — PATIENT INSTRUCTIONS
Botulinum Toxin(Botox) for Hyperhidrosis:    With treatment side effects may include bruising or discomfort at the site(s). Asymmetry may occur and a touch-up may be required. Risks of this procedure include numbness, twitching, headache, not enough effect or too much effect, loss of muscle tone, or infection.      Showering is not restricted.     Do not rub the treated area.     Avoid exercise for the 24 hours following the procedure.     Avoid deodorant for 24 hours following the procedure    Some people will experience bruising.  This is temporary and usually mild.    Call your doctor if you have any questions or concerns after your treatment.     Who should I call with questions?    [facility]: [phone]     [facility]: [phone]    For urgent needs outside of business hours call the [facility] at [phone] and ask for the dermatology resident on call

## 2018-02-19 LAB
CATECHOLS UR-IMP: NORMAL
COLLECT DURATION TIME UR: NORMAL HR
CREAT 24H UR-MRATE: NORMAL MG/D (ref 700–1600)
CREAT UR-MCNC: 224 MG/DL
DOPAMINE 24H UR-MRATE: NORMAL UG/D (ref 77–324)
DOPAMINE UR-MCNC: 17 UG/L
DOPAMINE/CREAT UR: 205 UG/G CRT (ref 0–250)
EPINEPH 24H UR-MRATE: NORMAL UG/D (ref 1–7)
EPINEPH UR-MCNC: 460 UG/L
EPINEPH/CREAT UR: 8 UG/G CRT (ref 0–20)
NOREPINEPH 24H UR-MRATE: NORMAL UG/D (ref 16–71)
NOREPINEPH UR-MCNC: 40 UG/L
NOREPINEPH/CREAT UR: 18 UG/G CRT (ref 0–45)
SPECIMEN VOL ?TM UR: NORMAL ML

## 2018-05-15 ENCOUNTER — TELEPHONE (OUTPATIENT)
Dept: DERMATOLOGY | Facility: CLINIC | Age: 28
End: 2018-05-15

## 2018-05-15 NOTE — TELEPHONE ENCOUNTER
Health Call Center    Phone Message    May a detailed message be left on voicemail: yes    Reason for Call: Other: Pt was wanting to know if she could be worked in to see Dr. Miller in  for another Botox injection. She was last seen in Feb, and had gotten the okay from her insurance for her to be seen for the procedure. Pt stated the order from her insurance will  in , but she could not remember when. No current openings on template; can pt be worked in? Please advise.     Action Taken: Other: Messaged routed to: Clinic Coord Derm Vasc

## 2018-05-16 DIAGNOSIS — R61 HYPERHIDROSIS: ICD-10-CM

## 2018-05-16 NOTE — TELEPHONE ENCOUNTER
"Per Litzy Mitchell \"The patient's PA does not  until 18 & I confirmed with the patient that her insurance will not be changing so I have submitted a new PA to start on 18. You are okay to schedule her at this time & I will let you both know as soon as I hear back in regards to the new PA. \"    Called and informed patient of appointment time  12pm     Sarah Churchill RN     "

## 2018-06-03 DIAGNOSIS — L30.4 INTERTRIGO: ICD-10-CM

## 2018-06-05 RX ORDER — KETOCONAZOLE 20 MG/G
CREAM TOPICAL DAILY
Qty: 60 G | Refills: 3 | Status: SHIPPED | OUTPATIENT
Start: 2018-06-05 | End: 2018-07-11

## 2018-07-02 ENCOUNTER — PRE VISIT (OUTPATIENT)
Dept: DERMATOLOGY | Facility: CLINIC | Age: 28
End: 2018-07-02

## 2018-07-02 NOTE — TELEPHONE ENCOUNTER
Reminder call regarding upcoming appointment with Dr. Miller. Reminder to come in 30 minutes early for numbing and no deodorant before or 24 hours after.

## 2018-07-11 ENCOUNTER — OFFICE VISIT (OUTPATIENT)
Dept: DERMATOLOGY | Facility: CLINIC | Age: 28
End: 2018-07-11
Payer: COMMERCIAL

## 2018-07-11 DIAGNOSIS — L30.4 INTERTRIGO: ICD-10-CM

## 2018-07-11 DIAGNOSIS — R61 HYPERHIDROSIS: Primary | ICD-10-CM

## 2018-07-11 RX ORDER — KETOCONAZOLE 20 MG/G
CREAM TOPICAL DAILY
Qty: 60 G | Refills: 3 | Status: SHIPPED | OUTPATIENT
Start: 2018-07-11

## 2018-07-11 ASSESSMENT — PAIN SCALES - GENERAL
PAINLEVEL: NO PAIN (0)
PAINLEVEL: NO PAIN (0)

## 2018-07-11 NOTE — LETTER
7/11/2018       RE: Jai Parker  [address]  [place] [address]     Dear Colleague,    Thank you for referring your patient, Jai Parker, to the [facility] DERMATOLOGY at [facility]. Please see a copy of my visit note below.    See procedure note    See procedure note.     Again, thank you for allowing me to participate in the care of your patient.      Sincerely,    Emma Miller MD

## 2018-07-11 NOTE — NURSING NOTE
Dermatology Rooming Note    Jai Parker's goals for this visit include:   Chief Complaint   Patient presents with     Derm Problem     Jai is visiting for botox injection on the scalp. She notices 25% improvement.     Doreen Ordaz LPN

## 2018-07-11 NOTE — MR AVS SNAPSHOT
"              After Visit Summary   7/11/2018    Jai Parker    MRN: [id number]           Patient Information     Date Of Birth          1990        Visit Information        Provider Department      7/11/2018 12:00 PM Emma Miller MD [facility] Dermatology        Today's Diagnoses     Hyperhidrosis    -  1      Care Instructions    Botulinum Toxin(Botox) for Hyperhidrosis:    With treatment side effects may include bruising or discomfort at the site(s). Asymmetry may occur and a touch-up may be required. Risks of this procedure include numbness, twitching, headache, not enough effect or too much effect, loss of muscle tone, or infection.      Showering is not restricted.     Do not rub the treated area.     Avoid exercise for the 24 hours following the procedure.     Avoid deodorant for 24 hours following the procedure    Some people will experience bruising.  This is temporary and usually mild.    Call your doctor if you have any questions or concerns after your treatment.     Who should I call with questions?    [facility]: [phone]     [facility]: [phone]    For urgent needs outside of business hours call the [facility] at [phone] and ask for the dermatology resident on call      To help you find a board certified dermatologist visit \"Find a Dermatologist\"   You may navigate to the American Academy of Dermatology (AAD) website or use the direct link below:  https://www.aad.org/find-a-derm            Follow-ups after your visit        Follow-up notes from your care team     Return in about 6 months (around 1/11/2019).      Who to contact     Please call your clinic at [phone] to:    Ask questions about your health    Make or cancel appointments    Discuss your medicines    Learn about your test results    Speak to your doctor            Additional Information About Your Visit        MyChart Information     MyChart is " an electronic gateway that provides easy, online access to your medical records. With ABB, you can request a clinic appointment, read your test results, renew a prescription or communicate with your care team.     To sign up for ABB visit the website at [email or web address]   You will be asked to enter the access code listed below, as well as some personal information. Please follow the directions to create your username and password.     Your access code is: [email or web address]  Expires: 2018  6:31 AM     Your access code will  in 90 days. If you need help or a new code, please contact your [facility] or call [phone] for assistance.        Care EveryWhere ID     This is your Care EveryWhere ID. This could be used by other organizations to access your [place] medical records  [id number]         Blood Pressure from Last 3 Encounters:   18 136/88    Weight from Last 3 Encounters:   18 80.5 kg (177 lb 6.4 oz)              We Performed the Following     C Botulinum Toxin type A (Botox)        Primary Care Provider    None Specified       No primary provider on file.        Equal Access to Services     [facility]: Hadpasha Steen, nicolette martin, sampson bravo, adri cuadra . So [facility] [phone].    ATENCIÓN: Si habla español, tiene a tijerina disposición servicios gratuitos de asistencia lingüística. Llame al [phone].    We comply with applicable federal civil rights laws and [place] laws. We do not discriminate on the basis of race, color, national origin, age, disability, sex, sexual orientation, or gender identity.            Thank you!     Thank you for choosing [facility] DERMATOLOGY  for your care. Our goal is always to provide you with excellent care. Hearing back from our patients is one way we can continue to improve our services. Please take a few minutes to complete the written  survey that you may receive in the mail after your visit with us. Thank you!             Your Updated Medication List - Protect others around you: Learn how to safely use, store and throw away your medicines at www.disposemymeds.org.          This list is accurate as of 7/11/18 12:54 PM.  Always use your most recent med list.                   Brand Name Dispense Instructions for use Diagnosis    * botulinum toxin type A 100 units injection    BOTOX    200 Units    Inject 200 units intradermally into affected areas of the scalp.    Focal hyperhidrosis       * botulinum toxin type A 100 units injection    BOTOX    1 each    For clinic use Botox 100units  Dx Focal Hyperhidrosis    Hyperhidrosis       ketoconazole 2 % cream    NIZORAL    60 g    Apply topically daily    Intertrigo       oxybutynin 10 MG 24 hr tablet    DITROPAN XL    30 tablet    Take 1 tablet (10 mg) by mouth daily    Generalized hyperhidrosis       * Notice:  This list has 2 medication(s) that are the same as other medications prescribed for you. Read the directions carefully, and ask your doctor or other care provider to review them with you.

## 2018-07-11 NOTE — PROCEDURES
Botulinum Injection Procedure Note:  Medical    ATTENDING STAFF SURGEON: Dr. Emma Miller     RESIDENT SURGEON: none    NURSE: Sarah Churchill RN     ANESTHESIA:   None    PREOPERATIVE DIAGNOSIS:   Hyperhidrosis    LOCATION: Scalp/Hairline    LOT NO: [id number]    EXP DATE: 12/2020    OPERATION/PROCEDURE:   Intralesional botulinum toxin injection     Dilution with 4.0 cc preserved sterile normal saline in a 100 Unit Botox Vial.    Total units of botulinum toxin: 100 units     POSTOPERATIVE DIAGNOSIS:   SAME     PREPARATION:   Alcohol swab    DESCRIPTION OF OPERATION/PROCEDURE:   The nature and purpose of the procedure, associated risks including but not limited to bruising, headache or discomfort at the site(s), numbness, muscle twitching, brow or eyelid droop, headache, double vision, not enough effect or too much effect, difficulty whistling or drinking from a straw, loss of muscle tone, or infection. Possible consequences and complications, and alternative methods of treatment were explained in detail. The patient declined a personal or family history of neuromuscular disease prior to the procedure. The patient is not pregnant or breast feeding.A signed informed operative consent was obtained.    The patient was taken to the operative suite and properly positioned. The area to be treated was defined and confirmed by the patient and physician. The area for Botox injection was marked.    Therapeutic procedure: The patient was positiioned to optimally expose the area to be treated. Mulitple injections were made to infiltrate the toxin superficially.The area was carefully cleansed. The patient was instructed to keep this area dry and to continue antiperspirants as previously. The patient tolerated the procedure well and no comlications were noted. Patient was given post care instructions and will be followed up in 6 months.      Clinical Follow-Up: 6 months    Staff Involved:  Scribe/Staff      Scribe Disclosure:   ADAN  Keke Metz, am serving as a scribe to document services personally performed by Dr. Emma Miller, based on data collection and the provider's statements to me.           Provider Disclosure:   The documentation recorded by the scribe accurately reflects the services I personally performed and the decisions made by me.    Emma Miller MD    Department of Dermatology  [facility]: Phone: [phone], Fax:[phone]  [facility] [place]: Phone: [phone], Fax: [phone]

## 2018-07-11 NOTE — PATIENT INSTRUCTIONS
"Botulinum Toxin(Botox) for Hyperhidrosis:    With treatment side effects may include bruising or discomfort at the site(s). Asymmetry may occur and a touch-up may be required. Risks of this procedure include numbness, twitching, headache, not enough effect or too much effect, loss of muscle tone, or infection.      Showering is not restricted.     Do not rub the treated area.     Avoid exercise for the 24 hours following the procedure.     Avoid deodorant for 24 hours following the procedure    Some people will experience bruising.  This is temporary and usually mild.    Call your doctor if you have any questions or concerns after your treatment.     Who should I call with questions?    [facility]: [phone]     [facility]: [phone]    For urgent needs outside of business hours call the [facility] at [phone] and ask for the dermatology resident on call      To help you find a board certified dermatologist visit \"Find a Dermatologist\"   You may navigate to the American Academy of Dermatology (AAD) website or use the direct link below:  https://www.aad.org/find-a-derm    "

## 2019-02-01 ENCOUNTER — TELEPHONE (OUTPATIENT)
Dept: DERMATOLOGY | Facility: CLINIC | Age: 29
End: 2019-02-01

## 2019-02-01 NOTE — TELEPHONE ENCOUNTER
[facility] Call Center    Phone Message    May a detailed message be left on voicemail: yes    Reason for Call: Other: PT would like to schedule with Angela for Botox.  I was not able to pull up a schedule.  Please follow up with the PT.      Action Taken: Message routed to:  [facility] ([facility] Derm

## 2019-02-13 ENCOUNTER — OFFICE VISIT (OUTPATIENT)
Dept: DERMATOLOGY | Facility: CLINIC | Age: 29
End: 2019-02-13
Payer: COMMERCIAL

## 2019-02-13 ENCOUNTER — DOCUMENTATION ONLY (OUTPATIENT)
Dept: CARE COORDINATION | Facility: CLINIC | Age: 29
End: 2019-02-13

## 2019-02-13 DIAGNOSIS — L74.519 FOCAL HYPERHIDROSIS: Primary | ICD-10-CM

## 2019-02-13 DIAGNOSIS — L70.0 ACNE VULGARIS: ICD-10-CM

## 2019-02-13 DIAGNOSIS — L68.0 HIRSUTISM: ICD-10-CM

## 2019-02-13 RX ORDER — AZELAIC ACID 0.15 G/G
GEL TOPICAL
Qty: 50 G | Refills: 5 | Status: SHIPPED | OUTPATIENT
Start: 2019-02-13 | End: 2020-02-13

## 2019-02-13 ASSESSMENT — PAIN SCALES - GENERAL: PAINLEVEL: NO PAIN (0)

## 2019-02-13 NOTE — NURSING NOTE
Chief Complaint   Patient presents with     Botox     Anqunette is here today to be treated for Hyperhidrosis to the forehead area. Patient states last treatment was successful.     Leslie Carreon LPN

## 2019-02-13 NOTE — LETTER
2/13/2019       RE: Jai Parker  [address]  [facility] [address]     Dear Colleague,    Thank you for referring your patient, Jai Parker, to the [facility] DERMATOLOGY at [facility]. Please see a copy of my visit note below.    erroneous      [facility] Dermatology Note      Dermatology Problem List:    1. Hyperhidrosis--frontal hair line  -100 units of botox Q6months    2. Hirsutism and PIH  -Checked Dr. Miller's panel of hirsutism labs on 2/13/19    Encounter Date: Feb 13, 2019    CC:   Chief Complaint   Patient presents with     Botox     Jai is here today to be treated for Hyperhidrosis to the forehead area. Patient states last treatment was successful.         History of Present Illness:  Ms. Jai Parker is a 28 year old female who presents as a follow-up for hyperhidrosis of the frontal hairline. The patient was last seen in July 2018 when she had 100 units of botox injected. Since that visit, thept states the botox has been helpful, and it has continued to be effective. She denies any new areas of excessive sweating. The pt is not currently pregnant nor is she planning on becoming pregnant; she is not breastfeeding. The pt is also concerned about numerous small hairs she is growing on her chin. She denies a similar phenomenon anywhere else. She states he menstrual cycle is still regular at 28 days. The pt also notes she is developing new acne on her chin and lower cheeks, particularly around her menses. Otherwise, the pt denies any other general of skin-specific complaints at this time.     Past Medical History:   Patient Active Problem List   Diagnosis     Focal hyperhidrosis     No past medical history on file.  No past surgical history on file.    Social History:  Patient reports that she has been smoking cigarettes.  She has been smoking about 0.50 packs per day. she has never used smokeless tobacco.    Family  History:  Family History   Problem Relation Age of Onset     Melanoma No family hx of      Skin Cancer No family hx of        Medications:  Current Outpatient Medications   Medication Sig Dispense Refill     ketoconazole (NIZORAL) 2 % cream Apply topically daily 60 g 3     oxybutynin (DITROPAN XL) 10 MG 24 hr tablet Take 1 tablet (10 mg) by mouth daily (Patient not taking: Reported on 7/11/2018) 30 tablet 2        Allergies   Allergen Reactions     Penicillins Hives     Amoxicillin Rash         Review of Systems:     -Constitutional: Otherwise feeling well today, in usual state of health.       Physical exam:  Vitals: There were no vitals taken for this visit.  GEN: This is a well developed, well-nourished female in no acute distress, in a pleasant mood.    SKIN: Focused examination of the scalp, face, neck, eyelids, conjunctiva, and lips was performed.  -No baseline excessive sweating of the frontal scalp or hairline noted on exam today  -Several post-inflammatory macules on the chin and submental area with rare short hairs  -No other lesions of concern on areas examined.     Impression/Plan:    1. Hyperhidrosis--frontal hair line    See separate procedure note    2. Hirsutism and acne vulgaris    At this time, we are unsure why the pt is developing numerous hairs on her chin. Her menses appear to be regular.      Today, we will check hirsutism labs (TSH with free T4, Testosterone total, T4 free, DHEA sulfate, Prolactin, Vitamin D and 17-Hydroxyprogesterone)    Will start azelaic acid 15% gel to treat acne and PIH from chin hairs      Follow-up in 6 weeks, earlier for new or changing lesions.       Dr. Miller staffed the patient.    Staff Involved:  Resident(Lanie)/Staff  Staff Physician Comments:   I saw and evaluated the patient with the resident and I agree with the assessment and plan.  I was present for the examination..    Emma Miller MD    Department of Dermatology  [facility]  [facility]: Phone: [phone], Fax:[phone]  [facility]: Phone: [phone], Fax: [phone]

## 2019-02-13 NOTE — PATIENT INSTRUCTIONS
Botulinum Toxin(Botox) for Hyperhidrosis:    With treatment side effects may include bruising or discomfort at the site(s). Asymmetry may occur and a touch-up may be required. Risks of this procedure include numbness, twitching, headache, not enough effect or too much effect, loss of muscle tone, or infection.      Showering is not restricted.     Do not rub the treated area.     Avoid exercise for the 24 hours following the procedure.     Avoid deodorant for 24 hours following the procedure    Some people will experience bruising.  This is temporary and usually mild.    Call your doctor if you have any questions or concerns after your treatment.     Who should I call with questions?    [facility]: [phone]     [facility]: [phone]    For urgent needs outside of business hours call the [facility] at [phone] and ask for the dermatology resident on call

## 2019-02-13 NOTE — PROGRESS NOTES
[facility] Dermatology Note      Dermatology Problem List:    1. Hyperhidrosis--frontal hair line  -100 units of botox Q6months    2. Hirsutism and PIH  -Checked Dr. Miller's panel of hirsutism labs on 2/13/19    Encounter Date: Feb 13, 2019    CC:   Chief Complaint   Patient presents with     Botox     Jai is here today to be treated for Hyperhidrosis to the forehead area. Patient states last treatment was successful.         History of Present Illness:  Ms. Jai Parker is a 28 year old female who presents as a follow-up for hyperhidrosis of the frontal hairline. The patient was last seen in July 2018 when she had 100 units of botox injected. Since that visit, thept states the botox has been helpful, and it has continued to be effective. She denies any new areas of excessive sweating. The pt is not currently pregnant nor is she planning on becoming pregnant; she is not breastfeeding. The pt is also concerned about numerous small hairs she is growing on her chin. She denies a similar phenomenon anywhere else. She states he menstrual cycle is still regular at 28 days. The pt also notes she is developing new acne on her chin and lower cheeks, particularly around her menses. Otherwise, the pt denies any other general of skin-specific complaints at this time.     Past Medical History:   Patient Active Problem List   Diagnosis     Focal hyperhidrosis     No past medical history on file.  No past surgical history on file.    Social History:  Patient reports that she has been smoking cigarettes.  She has been smoking about 0.50 packs per day. she has never used smokeless tobacco.    Family History:  Family History   Problem Relation Age of Onset     Melanoma No family hx of      Skin Cancer No family hx of        Medications:  Current Outpatient Medications   Medication Sig Dispense Refill     ketoconazole (NIZORAL) 2 % cream Apply topically daily 60 g 3     oxybutynin (DITROPAN XL) 10 MG 24 hr  tablet Take 1 tablet (10 mg) by mouth daily (Patient not taking: Reported on 7/11/2018) 30 tablet 2        Allergies   Allergen Reactions     Penicillins Hives     Amoxicillin Rash         Review of Systems:     -Constitutional: Otherwise feeling well today, in usual state of health.       Physical exam:  Vitals: There were no vitals taken for this visit.  GEN: This is a well developed, well-nourished female in no acute distress, in a pleasant mood.    SKIN: Focused examination of the scalp, face, neck, eyelids, conjunctiva, and lips was performed.  -No baseline excessive sweating of the frontal scalp or hairline noted on exam today  -Several post-inflammatory macules on the chin and submental area with rare short hairs  -No other lesions of concern on areas examined.     Impression/Plan:    1. Hyperhidrosis--frontal hair line    See separate procedure note    2. Hirsutism and acne vulgaris    At this time, we are unsure why the pt is developing numerous hairs on her chin. Her menses appear to be regular.      Today, we will check hirsutism labs (TSH with free T4, Testosterone total, T4 free, DHEA sulfate, Prolactin, Vitamin D and 17-Hydroxyprogesterone)    Will start azelaic acid 15% gel to treat acne and PIH from chin hairs      Follow-up in 6 weeks, earlier for new or changing lesions.       Dr. Miller staffed the patient.    Staff Involved:  Resident(Lanie)/Staff  Staff Physician Comments:   I saw and evaluated the patient with the resident and I agree with the assessment and plan.  I was present for the examination..    Emma Miller MD    Department of Dermatology  [facility]: Phone: [phone], Fax:[phone]  [facility]: Phone: [phone], Fax: [phone]

## 2019-02-13 NOTE — PROCEDURES
Botulinum Injection Procedure Note:  Medical    ATTENDING STAFF SURGEON: Dr. Miller    RESIDENT SURGEON: Lanie     ANESTHESIA:   None    PREOPERATIVE DIAGNOSIS:   Hyperhidrosis    LOCATION: Frontal hairline    Lot: [id number]    EXP DATE: 07/2021    OPERATION/PROCEDURE:   Intralesional botulinum toxin injection     Dilution with preserved sterile normal saline in a 100 Unit Botox/ 4 botox Vial.    Total units of botulinum toxin: 100     POSTOPERATIVE DIAGNOSIS:   SAME     PREPARATION:   Alcohol swab    DESCRIPTION OF OPERATION/PROCEDURE:   The nature and purpose of the procedure, associated risks including but not limited to bruising, headache or discomfort at the site(s), numbness, muscle twitching, brow or eyelid droop, headache, double vision, not enough effect or too much effect, difficulty whistling or drinking from a straw, loss of muscle tone, or infection. Possible consequences and complications, and alternative methods of treatment were explained in detail. The patient declined a personal or family history of neuromuscular disease prior to the procedure. The patient is not pregnant or breast feeding.A signed informed operative consent was obtained.    The patient was taken to the operative suite and properly positioned. The area to be treated was defined and confirmed by the patient and physician. The area for Botox injection was marked.    Therapeutic procedure: The patient was positiioned to optimally expose the area to be treated. Mulitple injections were made to infiltrate the toxin superficially.The area was carefully cleansed. The patient was instructed to keep this area dry and to continue antiperspirants as previously. The patient tolerated the procedure well and no comlications were noted. Patient was given post care instructions and will be followed up in 6 months.      Dr. Miller was immediately available for the entire surgery and was physicially present for the key portions of the  procedure.    Clinical Follow-Up: 6 months    Staff Involved:  Resident/Staff       Staff Physician Comments:   I saw and evaluated the patient with the resident and I agree with the assessment and plan.  I was present for the key portions of the above major procedure and examination..    Emma Miller MD    Department of Dermatology  [facility]: Phone: [phone], Fax:[phone]  [facility]: Phone: [phone], Fax: [phone]

## 2024-11-14 ENCOUNTER — OFFICE VISIT (OUTPATIENT)
Dept: DERMATOLOGY | Facility: CLINIC | Age: 34
End: 2024-11-14

## 2024-11-14 DIAGNOSIS — L85.8 KP (KERATOSIS PILARIS): ICD-10-CM

## 2024-11-14 DIAGNOSIS — L74.519 FOCAL HYPERHIDROSIS: Primary | ICD-10-CM

## 2024-11-14 RX ORDER — CLINDAMYCIN PHOSPHATE 11.9 MG/ML
SOLUTION TOPICAL
COMMUNITY

## 2024-11-14 RX ORDER — VITAMIN B COMPLEX
1000 TABLET ORAL
COMMUNITY

## 2024-11-14 RX ORDER — BUPROPION HYDROCHLORIDE 150 MG/1
TABLET ORAL
COMMUNITY
Start: 2024-06-08

## 2024-11-14 ASSESSMENT — PAIN SCALES - GENERAL: PAINLEVEL_OUTOF10: SEVERE PAIN (7)

## 2024-11-14 NOTE — Clinical Note
11/14/2024       RE: Jai Parker  [address]  [place] [address]     Dear Colleague,    Thank you for referring your patient, Jai Parker, to the [facility] DERMATOLOGY CLINIC [place] at [facility]. Please see a copy of my visit note below.    [facility] Dermatology Note  Encounter Date: Nov 14, 2024  Office Visit     Dermatology Problem List:  #Hyperhidrosis, forehead, neck, groin.   - Labs pending: CBC w/ diff, CMP, TSH w/ reflex free T4, HgBA1c, urine metanephrines/catecholamines   - current tx: Botox   - prior tx: glycopyrrolate 2 mg PO BID uptitrated up to TID dosing (dry mouth), oxybutynin (dry mouth), oxybutynin XR 10 mg PO qdaily  #Keratosis pilaris  - current tx: amlactin     ____________________________________________    Assessment & Plan:     # Focal hyperhidrosis, forehead, chronic    Patient has a longstanding history of focal hyperhidrosis to the face and scalp.  She has previously trialed multiple oral medications including glycopyrrolate and oxybutynin, with negative anticholinergic side effects.  She has had significant success with Botox treatments (100 units in the past).  However, given patient's recent endorsement of palpitations with focal forehead night sweats, we will plan for repeat workup of secondary hyperhidrosis as below.  Pending unremarkable workup, will schedule for repeat Botox treatment of the forehead.  - CBC w/ diff, CMP, TSH w/ reflex free T4, HgBA1c, urine metanephrines/catecholamines ordered today   - Botox ordered for initiation of prior authorization procedures. Patient to be scheduled in resident CC procedure slot pending negative work up.     # Keratosis pilaris, bilateral arms and legs.   -Reviewed diagnosis and benign nature  -Start AmLactin OTC lotion to affected areas daily    Procedures Performed: None    Follow-up: as able to be scheduled in Botox  "clinic.     Staff and Resident: [unfilled] Patient seen and staffed with Dr. Chu Colby MD  Dermatology Resident PGY-2    ____________________________________________    CC: Derm Problem (Botox for hyperhidrosis- would like to discuss botox, previously did this with Dr. Miller)    HPI:  Ms. Jai Pakrer is a(n) 34 year old female who presents today as a new patient for hyperhidrosis.     Patient reports that she has a longstanding history of hyperhidrosis predominantly of her forehead.  She reports being seen in our office between 2017 and 2019, at which time she was well-controlled with annual Botox to the forehead.  She has not been seen in the last 5 years due to moving to [place], where she continued to be treated with Botox.  Patient reports continued symptoms given she it has been over a year since her last Botox treatment.  She reports this significantly affects her quality of life and causes much embarrassment.  Patient does endorse intermittent/episodic palpitations with focal night sweats.  She denies any unintentional weight loss, loss of appetite, and overall reports feeling well and health.  She is hoping to reestablish care today to restart Botox injections, noting that she has trialed many oral medications which were unsuccessful due to dryness.    Finally, patient reports concern about \"strawberry legs \".  She has not tried any medications for this, but is wondering if there is any creams or anything she can use to decrease the appearance of the bumps on her legs and arms.    Patient is otherwise feeling well, without additional skin concerns.    Labs Reviewed:   Latest Reference Range & Units 07/10/17 11:07 07/10/17 11:10   Sodium 133 - 144 mmol/L 136    Potassium 3.4 - 5.3 mmol/L 4.1    Chloride 94 - 109 mmol/L 106    Carbon Dioxide 20 - 32 mmol/L 24    Urea Nitrogen 7 - 30 mg/dL 11    Creatinine 0.52 - 1.04 mg/dL 0.65    GFR Estimate >60 mL/min/1.7m2 >90  Non African " American GFR Calc      GFR Estimate If Black >60 mL/min/1.7m2 >90  African American GFR Calc      Calcium 8.5 - 10.1 mg/dL 8.6    Anion Gap 3 - 14 mmol/L 7    Albumin 3.4 - 5.0 g/dL 3.6    Protein Total 6.8 - 8.8 g/dL 7.4    Alkaline Phosphatase 40 - 150 U/L 70    ALT 0 - 50 U/L 13    AST 0 - 45 U/L 21    Bilirubin Total 0.2 - 1.3 mg/dL 1.0    CATECHOLAMINES FRACTIONED URINE FREE   Rpt (C)   Hemoglobin A1C 4.3 - 6.0 % 5.8    METANEPHRINE RANDOM OR 24 HR URINE   Rpt   TSH 0.40 - 4.00 mU/L 0.67    (C): Corrected  Rpt: View report in Results Review for more information     Latest Reference Range & Units 07/10/17 11:07   WBC 4.0 - 11.0 10e9/L 11.0   Hemoglobin 11.7 - 15.7 g/dL 10.9 (L)   Hematocrit 35.0 - 47.0 % 33.3 (L)   Platelet Count 150 - 450 10e9/L 288   RBC Count 3.8 - 5.2 10e12/L 5.26 (H)   MCV 78 - 100 fl 63 (L)   MCH 26.5 - 33.0 pg 20.7 (L)   MCHC 31.5 - 36.5 g/dL 32.7   RDW 10.0 - 15.0 % 15.5 (H)   Diff Method  Automated Method   % Neutrophils % 69.6   % Lymphocytes % 20.0   % Monocytes % 6.9   % Eosinophils % 2.4   % Basophils % 0.6   % Immature Granulocytes % 0.5   Nucleated RBCs 0 /100 0   Absolute Neutrophil 1.6 - 8.3 10e9/L 7.7   Absolute Lymphocytes 0.8 - 5.3 10e9/L 2.2   Absolute Monocytes 0.0 - 1.3 10e9/L 0.8   Absolute Eosinophils 0.0 - 0.7 10e9/L 0.3   Absolute Basophils 0.0 - 0.2 10e9/L 0.1   Abs Immature Granulocytes 0 - 0.4 10e9/L 0.1   Absolute Nucleated RBC  0.0   Sed Rate 0 - 20 mm/h 33 (H)   (L): Data is abnormally low  (H): Data is abnormally high      Physical Exam:  Vitals: There were no vitals taken for this visit.  SKIN: Focused examination of upper arms, legs, and forehead was performed.  - Perifollicular hyperkeratotic papules scattered along the dorsal upper arms and anterior legs.   - No other lesions of concern on areas examined.     Medications:  Current Outpatient Medications   Medication Sig Dispense Refill    buPROPion (WELLBUTRIN XL) 150 MG 24 hr tablet Take 1 Tablet (150 mg)  by mouth daily.*      clindamycin (CLEOCIN T) 1 % external solution APPLY TO AFFECTED AREAS EVERY DAY      etonogestrel (NEXPLANON) 68 MG IMPL Inject 68 mg subcutaneously.      ketoconazole (NIZORAL) 2 % cream Apply topically daily 60 g 3    Vitamin D3 (VITAMIN D-1000 MAX ST) 25 mcg (1000 units) tablet Take 1,000 Units by mouth.      oxybutynin (DITROPAN XL) 10 MG 24 hr tablet Take 1 tablet (10 mg) by mouth daily (Patient not taking: Reported on 11/14/2024) 30 tablet 2     No current facility-administered medications for this visit.      Past Medical History:   Patient Active Problem List   Diagnosis    Focal hyperhidrosis     History reviewed. No pertinent past medical history.    CC Referred MD Atul  No address on file on close of this encounter.      I talked with and examined Jai Parker and I agree with the assessment and the plan. KATHRYN Sage MD.        Again, thank you for allowing me to participate in the care of your patient.      Sincerely,    Marlin Colby MD

## 2024-11-14 NOTE — PROGRESS NOTES
I talked with and examined Jai Parker and I agree with the assessment and the plan. KATHRYN Sage MD.     VTE Assessment already completed for this visit

## 2024-11-14 NOTE — PROGRESS NOTES
[facility] Dermatology Note  Encounter Date: Nov 14, 2024  Office Visit     Dermatology Problem List:  #Hyperhidrosis, forehead, neck, groin.   - Labs pending: CBC w/ diff, CMP, TSH w/ reflex free T4, HgBA1c, urine metanephrines/catecholamines   - current tx: Botox   - prior tx: glycopyrrolate 2 mg PO BID uptitrated up to TID dosing (dry mouth), oxybutynin (dry mouth), oxybutynin XR 10 mg PO qdaily  #Keratosis pilaris  - current tx: amlactin     ____________________________________________    Assessment & Plan:     # Focal hyperhidrosis, forehead, chronic    Patient has a longstanding history of focal hyperhidrosis to the face and scalp.  She has previously trialed multiple oral medications including glycopyrrolate and oxybutynin, with negative anticholinergic side effects.  She has had significant success with Botox treatments (100 units in the past).  However, given patient's recent endorsement of palpitations with focal forehead night sweats, we will plan for repeat workup of secondary hyperhidrosis as below.  Pending unremarkable workup, will schedule for repeat Botox treatment of the forehead.  - CBC w/ diff, CMP, TSH w/ reflex free T4, HgBA1c, urine metanephrines/catecholamines ordered today   - Botox ordered for initiation of prior authorization procedures. Patient to be scheduled in resident CC procedure slot pending negative work up.     # Keratosis pilaris, bilateral arms and legs.   -Reviewed diagnosis and benign nature  -Start AmLactin OTC lotion to affected areas daily    Procedures Performed: None    Follow-up: as able to be scheduled in Botox clinic.     Staff and Resident: [unfilled] Patient seen and staffed with Dr. Chu Colby MD  Dermatology Resident PGY-2    ____________________________________________    CC: Derm Problem (Botox for hyperhidrosis- would like to discuss botox, previously did this with Dr. Miller)    HPI:  MsRenny BOOTH Elena is a(n 34  "year old female who presents today as a new patient for hyperhidrosis.     Patient reports that she has a longstanding history of hyperhidrosis predominantly of her forehead.  She reports being seen in our office between 2017 and 2019, at which time she was well-controlled with annual Botox to the forehead.  She has not been seen in the last 5 years due to moving to [place], where she continued to be treated with Botox.  Patient reports continued symptoms given she it has been over a year since her last Botox treatment.  She reports this significantly affects her quality of life and causes much embarrassment.  Patient does endorse intermittent/episodic palpitations with focal night sweats.  She denies any unintentional weight loss, loss of appetite, and overall reports feeling well and health.  She is hoping to reestablish care today to restart Botox injections, noting that she has trialed many oral medications which were unsuccessful due to dryness.    Finally, patient reports concern about \"strawberry legs \".  She has not tried any medications for this, but is wondering if there is any creams or anything she can use to decrease the appearance of the bumps on her legs and arms.    Patient is otherwise feeling well, without additional skin concerns.    Labs Reviewed:   Latest Reference Range & Units 07/10/17 11:07 07/10/17 11:10   Sodium 133 - 144 mmol/L 136    Potassium 3.4 - 5.3 mmol/L 4.1    Chloride 94 - 109 mmol/L 106    Carbon Dioxide 20 - 32 mmol/L 24    Urea Nitrogen 7 - 30 mg/dL 11    Creatinine 0.52 - 1.04 mg/dL 0.65    GFR Estimate >60 mL/min/1.7m2 >90  Non African American GFR Calc      GFR Estimate If Black >60 mL/min/1.7m2 >90  African American GFR Calc      Calcium 8.5 - 10.1 mg/dL 8.6    Anion Gap 3 - 14 mmol/L 7    Albumin 3.4 - 5.0 g/dL 3.6    Protein Total 6.8 - 8.8 g/dL 7.4    Alkaline Phosphatase 40 - 150 U/L 70    ALT 0 - 50 U/L 13    AST 0 - 45 U/L 21    Bilirubin Total 0.2 - 1.3 mg/dL 1.0  "   CATECHOLAMINES FRACTIONED URINE FREE   Rpt (C)   Hemoglobin A1C 4.3 - 6.0 % 5.8    METANEPHRINE RANDOM OR 24 HR URINE   Rpt   TSH 0.40 - 4.00 mU/L 0.67    (C): Corrected  Rpt: View report in Results Review for more information     Latest Reference Range & Units 07/10/17 11:07   WBC 4.0 - 11.0 10e9/L 11.0   Hemoglobin 11.7 - 15.7 g/dL 10.9 (L)   Hematocrit 35.0 - 47.0 % 33.3 (L)   Platelet Count 150 - 450 10e9/L 288   RBC Count 3.8 - 5.2 10e12/L 5.26 (H)   MCV 78 - 100 fl 63 (L)   MCH 26.5 - 33.0 pg 20.7 (L)   MCHC 31.5 - 36.5 g/dL 32.7   RDW 10.0 - 15.0 % 15.5 (H)   Diff Method  Automated Method   % Neutrophils % 69.6   % Lymphocytes % 20.0   % Monocytes % 6.9   % Eosinophils % 2.4   % Basophils % 0.6   % Immature Granulocytes % 0.5   Nucleated RBCs 0 /100 0   Absolute Neutrophil 1.6 - 8.3 10e9/L 7.7   Absolute Lymphocytes 0.8 - 5.3 10e9/L 2.2   Absolute Monocytes 0.0 - 1.3 10e9/L 0.8   Absolute Eosinophils 0.0 - 0.7 10e9/L 0.3   Absolute Basophils 0.0 - 0.2 10e9/L 0.1   Abs Immature Granulocytes 0 - 0.4 10e9/L 0.1   Absolute Nucleated RBC  0.0   Sed Rate 0 - 20 mm/h 33 (H)   (L): Data is abnormally low  (H): Data is abnormally high      Physical Exam:  Vitals: There were no vitals taken for this visit.  SKIN: Focused examination of upper arms, legs, and forehead was performed.  - Perifollicular hyperkeratotic papules scattered along the dorsal upper arms and anterior legs.   - No other lesions of concern on areas examined.     Medications:  Current Outpatient Medications   Medication Sig Dispense Refill    buPROPion (WELLBUTRIN XL) 150 MG 24 hr tablet Take 1 Tablet (150 mg) by mouth daily.*      clindamycin (CLEOCIN T) 1 % external solution APPLY TO AFFECTED AREAS EVERY DAY      etonogestrel (NEXPLANON) 68 MG IMPL Inject 68 mg subcutaneously.      ketoconazole (NIZORAL) 2 % cream Apply topically daily 60 g 3    Vitamin D3 (VITAMIN D-1000 MAX ST) 25 mcg (1000 units) tablet Take 1,000 Units by mouth.       oxybutynin (DITROPAN XL) 10 MG 24 hr tablet Take 1 tablet (10 mg) by mouth daily (Patient not taking: Reported on 11/14/2024) 30 tablet 2     No current facility-administered medications for this visit.      Past Medical History:   Patient Active Problem List   Diagnosis    Focal hyperhidrosis     History reviewed. No pertinent past medical history.    CC Referred Self, MD  No address on file on close of this encounter.

## 2024-11-14 NOTE — NURSING NOTE
Dermatology Rooming Note    Jai Parker's goals for this visit include:   Chief Complaint   Patient presents with    Derm Problem     Botox for hyperhidrosis- would like to discuss botox, previously did this with Dr. Angela Bennett, [place]

## 2024-11-15 ENCOUNTER — TELEPHONE (OUTPATIENT)
Dept: DERMATOLOGY | Facility: CLINIC | Age: 34
End: 2024-11-15

## 2024-11-15 DIAGNOSIS — L74.519 FOCAL HYPERHIDROSIS: Primary | ICD-10-CM

## 2024-11-15 NOTE — TELEPHONE ENCOUNTER
Called and scheduled with pt. Added to wait list for a sooner appt.     Routing to Dr. Hope and Solitario to get coverage verified prior to appt.     Monica Almaraz, Complex  11/15/2024 9:55 AM

## 2025-03-10 NOTE — TELEPHONE ENCOUNTER
"Per SHANA Rosen was denied due to \"Documentation has not shown that you have tried and failed topical preparations such as Xerac AC.\"       "

## 2025-03-10 NOTE — TELEPHONE ENCOUNTER
Assessment & Plan:      # Focal hyperhidrosis, forehead, chronic     Patient has a longstanding history of focal hyperhidrosis to the face and scalp.  She has previously trialed multiple oral medications including glycopyrrolate and oxybutynin, with negative anticholinergic side effects.  She has had significant success with Botox treatments (100 units in the past).  However, given patient's recent endorsement of palpitations with focal forehead night sweats, we will plan for repeat workup of secondary hyperhidrosis as below.  Pending unremarkable workup, will schedule for repeat Botox treatment of the forehead.  - CBC w/ diff, CMP, TSH w/ reflex free T4, HgBA1c, urine metanephrines/catecholamines ordered today   - Botox ordered for initiation of prior authorization procedures. Patient to be scheduled in resident CC procedure slot pending negative work up.

## 2025-03-11 NOTE — TELEPHONE ENCOUNTER
Called and left detailed message explaining that botox PA has been denied by her insurance so we will need to cancel upcoming botox appointment. Informed pt that we are working on appealing the denial and writing a LMN but per Kirk Chavez, this could take up to 30 days. Will try to work patient back into resident clinic procedure when we receive an appeal decision. Requested a call back if she has any questions or concerns.    Solitario NUGENT CMA - Dermatology

## 2025-03-22 ENCOUNTER — HEALTH MAINTENANCE LETTER (OUTPATIENT)
Age: 35
End: 2025-03-22

## 2025-04-01 ENCOUNTER — MYC MEDICAL ADVICE (OUTPATIENT)
Dept: DERMATOLOGY | Facility: CLINIC | Age: 35
End: 2025-04-01
Payer: COMMERCIAL

## 2025-04-10 ENCOUNTER — OFFICE VISIT (OUTPATIENT)
Dept: DERMATOLOGY | Facility: CLINIC | Age: 35
End: 2025-04-10
Payer: COMMERCIAL

## 2025-04-10 DIAGNOSIS — L74.519 FOCAL HYPERHIDROSIS: Primary | ICD-10-CM

## 2025-04-10 ASSESSMENT — PAIN SCALES - GENERAL: PAINLEVEL_OUTOF10: NO PAIN (0)

## 2025-04-10 NOTE — PROGRESS NOTES
Botulinum Injection Procedure Note:  Medical    Dermatology Problem List:  # Hyperhidrosis, forehead, neck, groin.   - Labs pending: CBC w/ diff, CMP, TSH w/ reflex free T4, HgBA1c, urine metanephrines/catecholamines (ordered 11/2024, not completed)  - current tx: Botox injections - 100 units q3-6 months (approved for q70 days)  - prior tx: glycopyrrolate 2 mg PO BID uptitrated up to TID dosing (dry mouth), oxybutynin (dry mouth), oxybutynin XR 10 mg PO qdaily, topical aluminum chloride  # Keratosis pilaris  - current tx: amlactin     SURGEON (S): Dr. Rosalind Hope (PGY4), Dr. Chu ONEAL     NURSE: Solitario     ANESTHESIA:   Ice packs    PREOPERATIVE DIAGNOSIS:   Hyperhidrosis    LOCATION: frontal hairline    LOT NO: [id number]    EXP DATE: 2027/05    OPERATION/PROCEDURE:   Intralesional botulinum toxin injection     Dilution with preserved sterile normal saline in a 100 Unit Botox/ 4 botox Vial.     Total units of botulinum toxin: 100 units.     POSTOPERATIVE DIAGNOSIS:   SAME     PREPARATION:   Alcohol swab    DESCRIPTION OF OPERATION/PROCEDURE:   The nature and purpose of the procedure, associated risks including but not limited to bruising, headache or discomfort at the site(s), numbness, muscle twitching, brow or eyelid droop, headache, double vision, not enough effect or too much effect, difficulty whistling or drinking from a straw, loss of muscle tone, or infection. Possible consequences and complications, and alternative methods of treatment were explained in detail. The patient declined a personal or family history of neuromuscular disease prior to the procedure. The patient is not pregnant or breast feeding. Informed consent was obtained.    The patient was taken to the operative suite and properly positioned. The area to be treated was defined and confirmed by the patient and physician. The area for Botox injection was marked.    Therapeutic procedure: The patient was positioned to optimally expose the area to  be treated. Mulitple injections were made to infiltrate the toxin superficially.The area was carefully cleansed. The patient was instructed to keep this area dry and to continue antiperspirants as previously. The patient tolerated the procedure well and no comlications were noted. Patient was given post care instructions.     Clinical Follow-Up: 3-6 months for repeat botox      Staff Involved:  Resident/Staff     Rosalind Hope MD  PGY4 Dermatology Resident

## 2025-04-10 NOTE — PROGRESS NOTES
I talked with and examined Jai Parker and I agree with the assessment and the plan. I was present for the botox  procedure. KATHRYN Sage MD.

## 2025-04-10 NOTE — NURSING NOTE
Drug Administration Record    Prior to injection, verified patient identity using patient's name and date of birth.  Due to injection administration, patient instructed to remain in clinic for 15 minutes  afterwards, and to report any adverse reaction to me immediately.    Drug Name: botulinum toxin with expiration date of 05/2027  Dose: 100 Units  Route administered: ID  NDC #: 1820-7833-63  Amount of waste(mL): 0 mL  Reason for waste: Single dose vial    LOT #: [id number]  SITE: See chart notes  : [employer]  EXPIRATION DATE: 05/2027

## 2025-04-10 NOTE — NURSING NOTE
Dermatology Rooming Note    Jai Parker's goals for this visit include:   Chief Complaint   Patient presents with    Procedure     Hyperhydrosis- Botox injections for forehead      Solitario NUGENT CMA - Dermatology

## 2025-06-23 ENCOUNTER — PATIENT OUTREACH (OUTPATIENT)
Dept: CARE COORDINATION | Facility: CLINIC | Age: 35
End: 2025-06-23
Payer: COMMERCIAL

## 2025-06-23 ENCOUNTER — TELEPHONE (OUTPATIENT)
Dept: DERMATOLOGY | Facility: CLINIC | Age: 35
End: 2025-06-23
Payer: COMMERCIAL

## 2025-06-23 NOTE — TELEPHONE ENCOUNTER
6/23 Sent [employer] (1st Attempt) for the patient to call back and schedule the following:    Appointment type: Procedure  Provider: Any Resident (Jamal)  Return date: 10/16/25  Specialty phone number: [phone]  Additional appointment(s) needed: na  Additonal Notes: na

## 2025-06-24 NOTE — TELEPHONE ENCOUNTER
6/24 Patient confirmed scheduled appointment:  Date: 10/16/25  Time: 8am  Visit type: Procedure  Provider: Jamal  [place] [facility] [place]: na  Additional notes: na